# Patient Record
Sex: FEMALE | Race: WHITE | NOT HISPANIC OR LATINO | Employment: FULL TIME | URBAN - METROPOLITAN AREA
[De-identification: names, ages, dates, MRNs, and addresses within clinical notes are randomized per-mention and may not be internally consistent; named-entity substitution may affect disease eponyms.]

---

## 2022-07-08 ENCOUNTER — LAB VISIT (OUTPATIENT)
Dept: LAB | Facility: HOSPITAL | Age: 37
End: 2022-07-08
Attending: STUDENT IN AN ORGANIZED HEALTH CARE EDUCATION/TRAINING PROGRAM
Payer: COMMERCIAL

## 2022-07-08 ENCOUNTER — OFFICE VISIT (OUTPATIENT)
Dept: FAMILY MEDICINE | Facility: CLINIC | Age: 37
End: 2022-07-08
Payer: COMMERCIAL

## 2022-07-08 VITALS
HEIGHT: 66 IN | HEART RATE: 61 BPM | DIASTOLIC BLOOD PRESSURE: 75 MMHG | WEIGHT: 142.38 LBS | RESPIRATION RATE: 18 BRPM | BODY MASS INDEX: 22.88 KG/M2 | SYSTOLIC BLOOD PRESSURE: 111 MMHG | OXYGEN SATURATION: 99 % | TEMPERATURE: 98 F

## 2022-07-08 DIAGNOSIS — R51.9 CHRONIC NONINTRACTABLE HEADACHE, UNSPECIFIED HEADACHE TYPE: ICD-10-CM

## 2022-07-08 DIAGNOSIS — S03.00XS DISLOCATION OF TEMPOROMANDIBULAR JOINT, SEQUELA: ICD-10-CM

## 2022-07-08 DIAGNOSIS — F41.9 ANXIETY: ICD-10-CM

## 2022-07-08 DIAGNOSIS — G89.29 CHRONIC NONINTRACTABLE HEADACHE, UNSPECIFIED HEADACHE TYPE: ICD-10-CM

## 2022-07-08 DIAGNOSIS — M40.202 KYPHOSIS OF CERVICAL REGION, UNSPECIFIED KYPHOSIS TYPE: ICD-10-CM

## 2022-07-08 DIAGNOSIS — Z00.00 ANNUAL PHYSICAL EXAM: ICD-10-CM

## 2022-07-08 DIAGNOSIS — Z00.00 ANNUAL PHYSICAL EXAM: Primary | ICD-10-CM

## 2022-07-08 DIAGNOSIS — Z23 NEED FOR TDAP VACCINATION: ICD-10-CM

## 2022-07-08 PROBLEM — S03.00XA TMJ (DISLOCATION OF TEMPOROMANDIBULAR JOINT): Status: ACTIVE | Noted: 2022-07-08

## 2022-07-08 LAB
ALBUMIN SERPL-MCNC: 4.4 GM/DL (ref 3.5–5)
ALBUMIN/GLOB SERPL: 1.3 RATIO (ref 1.1–2)
ALP SERPL-CCNC: 40 UNIT/L (ref 40–150)
ALT SERPL-CCNC: 13 UNIT/L (ref 0–55)
AST SERPL-CCNC: 20 UNIT/L (ref 5–34)
BILIRUBIN DIRECT+TOT PNL SERPL-MCNC: 0.6 MG/DL
BUN SERPL-MCNC: 11.6 MG/DL (ref 7–18.7)
CALCIUM SERPL-MCNC: 10 MG/DL (ref 8.4–10.2)
CHLORIDE SERPL-SCNC: 103 MMOL/L (ref 98–107)
CHOLEST SERPL-MCNC: 206 MG/DL
CHOLEST/HDLC SERPL: 3 {RATIO} (ref 0–5)
CO2 SERPL-SCNC: 28 MMOL/L (ref 22–29)
CREAT SERPL-MCNC: 0.71 MG/DL (ref 0.55–1.02)
GLOBULIN SER-MCNC: 3.4 GM/DL (ref 2.4–3.5)
GLUCOSE SERPL-MCNC: 86 MG/DL (ref 74–100)
HDLC SERPL-MCNC: 79 MG/DL (ref 35–60)
LDLC SERPL CALC-MCNC: 114 MG/DL (ref 50–140)
POTASSIUM SERPL-SCNC: 4.1 MMOL/L (ref 3.5–5.1)
PROT SERPL-MCNC: 7.8 GM/DL (ref 6.4–8.3)
SODIUM SERPL-SCNC: 140 MMOL/L (ref 136–145)
TRIGL SERPL-MCNC: 64 MG/DL (ref 37–140)
VLDLC SERPL CALC-MCNC: 13 MG/DL

## 2022-07-08 PROCEDURE — 3008F BODY MASS INDEX DOCD: CPT | Mod: CPTII,,, | Performed by: STUDENT IN AN ORGANIZED HEALTH CARE EDUCATION/TRAINING PROGRAM

## 2022-07-08 PROCEDURE — 90471 PR IMMUNIZ ADMIN,1 SINGLE/COMB VAC/TOXOID: ICD-10-PCS | Mod: ,,, | Performed by: STUDENT IN AN ORGANIZED HEALTH CARE EDUCATION/TRAINING PROGRAM

## 2022-07-08 PROCEDURE — 3078F PR MOST RECENT DIASTOLIC BLOOD PRESSURE < 80 MM HG: ICD-10-PCS | Mod: CPTII,,, | Performed by: STUDENT IN AN ORGANIZED HEALTH CARE EDUCATION/TRAINING PROGRAM

## 2022-07-08 PROCEDURE — 3078F DIAST BP <80 MM HG: CPT | Mod: CPTII,,, | Performed by: STUDENT IN AN ORGANIZED HEALTH CARE EDUCATION/TRAINING PROGRAM

## 2022-07-08 PROCEDURE — 90715 TDAP VACCINE 7 YRS/> IM: CPT | Mod: ,,, | Performed by: STUDENT IN AN ORGANIZED HEALTH CARE EDUCATION/TRAINING PROGRAM

## 2022-07-08 PROCEDURE — 90471 IMMUNIZATION ADMIN: CPT | Mod: ,,, | Performed by: STUDENT IN AN ORGANIZED HEALTH CARE EDUCATION/TRAINING PROGRAM

## 2022-07-08 PROCEDURE — 1159F PR MEDICATION LIST DOCUMENTED IN MEDICAL RECORD: ICD-10-PCS | Mod: CPTII,,, | Performed by: STUDENT IN AN ORGANIZED HEALTH CARE EDUCATION/TRAINING PROGRAM

## 2022-07-08 PROCEDURE — 99385 PREV VISIT NEW AGE 18-39: CPT | Mod: 25,,, | Performed by: STUDENT IN AN ORGANIZED HEALTH CARE EDUCATION/TRAINING PROGRAM

## 2022-07-08 PROCEDURE — 80061 LIPID PANEL: CPT

## 2022-07-08 PROCEDURE — 3074F PR MOST RECENT SYSTOLIC BLOOD PRESSURE < 130 MM HG: ICD-10-PCS | Mod: CPTII,,, | Performed by: STUDENT IN AN ORGANIZED HEALTH CARE EDUCATION/TRAINING PROGRAM

## 2022-07-08 PROCEDURE — 1159F MED LIST DOCD IN RCRD: CPT | Mod: CPTII,,, | Performed by: STUDENT IN AN ORGANIZED HEALTH CARE EDUCATION/TRAINING PROGRAM

## 2022-07-08 PROCEDURE — 90715 PR TDAP VACCINE >7 YO, IM: ICD-10-PCS | Mod: ,,, | Performed by: STUDENT IN AN ORGANIZED HEALTH CARE EDUCATION/TRAINING PROGRAM

## 2022-07-08 PROCEDURE — 80053 COMPREHEN METABOLIC PANEL: CPT

## 2022-07-08 PROCEDURE — 3074F SYST BP LT 130 MM HG: CPT | Mod: CPTII,,, | Performed by: STUDENT IN AN ORGANIZED HEALTH CARE EDUCATION/TRAINING PROGRAM

## 2022-07-08 PROCEDURE — 3008F PR BODY MASS INDEX (BMI) DOCUMENTED: ICD-10-PCS | Mod: CPTII,,, | Performed by: STUDENT IN AN ORGANIZED HEALTH CARE EDUCATION/TRAINING PROGRAM

## 2022-07-08 PROCEDURE — 99385 PR PREVENTIVE VISIT,NEW,18-39: ICD-10-PCS | Mod: 25,,, | Performed by: STUDENT IN AN ORGANIZED HEALTH CARE EDUCATION/TRAINING PROGRAM

## 2022-07-08 PROCEDURE — 36415 COLL VENOUS BLD VENIPUNCTURE: CPT

## 2022-07-08 RX ORDER — SUMATRIPTAN 50 MG/1
TABLET, FILM COATED ORAL
COMMUNITY
Start: 2022-06-13 | End: 2022-07-08 | Stop reason: SDUPTHER

## 2022-07-08 RX ORDER — BUSPIRONE HYDROCHLORIDE 10 MG/1
10 TABLET ORAL 2 TIMES DAILY
COMMUNITY
Start: 2022-06-07 | End: 2023-07-23

## 2022-07-08 RX ORDER — SUMATRIPTAN 50 MG/1
50 TABLET, FILM COATED ORAL ONCE
Qty: 10 TABLET | Refills: 3 | Status: SHIPPED | OUTPATIENT
Start: 2022-07-08 | End: 2023-07-23

## 2022-07-08 NOTE — PROGRESS NOTES
"Subjective:      Patient ID: Kerri Ramsey is a 37 y.o.  female.    Chief Complaint: Establish Care/Wellness    Preventative Health: Patient amenable to Tdap vaccine. Patient following with Dr. Richard Serrato with OB-GYN for her well-woman exam. She used to live in [place], [place] and states she had a Pap smear there that was good for 5 years.    Family Hx: Father has bladder CA.    Headaches/TMJ/Kyphosis of Neck: Patient taking Imitrex PRN. Sometimes she takes it 1-2x weekly or none at all. Patient following with a chiropractor as well.    Hepatitis C: Patient following with Dr. Benji Isaac with GI. Patient has been treated.    Anxiety: Patient following with Psychiatry. She is taking Buspar 10mg BID.    Review of Systems   Constitutional: Negative for activity change, fatigue and fever.   Eyes: Negative for visual disturbance.   Respiratory: Negative for apnea, cough and shortness of breath.    Cardiovascular: Negative for chest pain and leg swelling.   Gastrointestinal: Negative for abdominal pain, diarrhea and nausea.   Genitourinary: Negative for dysuria and hematuria.   Musculoskeletal: Positive for arthralgias (+TMJ) and neck pain. Negative for back pain and myalgias.   Skin: Negative for rash and wound.   Neurological: Positive for headaches. Negative for dizziness, weakness and numbness.   Psychiatric/Behavioral: Negative for behavioral problems, confusion, hallucinations and suicidal ideas. The patient is nervous/anxious.      Objective:   /75 (BP Location: Right arm)   Pulse 61   Temp 98.3 °F (36.8 °C)   Resp 18   Ht 5' 5.5" (1.664 m)   Wt 64.6 kg (142 lb 6.4 oz)   LMP 06/28/2022 (Exact Date)   SpO2 99%   BMI 23.34 kg/m²     Physical Exam  Vitals and nursing note reviewed.   Constitutional:       General: She is not in acute distress.     Appearance: Normal appearance. She is not ill-appearing or toxic-appearing.   HENT:      Head: Normocephalic and atraumatic.      " Mouth/Throat:      Mouth: Mucous membranes are moist.      Pharynx: Oropharynx is clear.   Cardiovascular:      Rate and Rhythm: Normal rate and regular rhythm.   Pulmonary:      Effort: Pulmonary effort is normal. No respiratory distress.      Breath sounds: Normal breath sounds.   Abdominal:      General: There is no distension.      Palpations: Abdomen is soft.      Tenderness: There is no abdominal tenderness.   Musculoskeletal:         General: Normal range of motion.      Cervical back: Neck supple. No tenderness.      Right lower leg: No edema.      Left lower leg: No edema.   Lymphadenopathy:      Cervical: No cervical adenopathy.   Skin:     General: Skin is warm and dry.      Findings: No lesion or rash.   Neurological:      General: No focal deficit present.      Mental Status: She is alert.   Psychiatric:         Mood and Affect: Mood normal.         Behavior: Behavior normal.         Thought Content: Thought content normal.         Judgment: Judgment normal.       Assessment:     1. Annual physical exam    2. Need for Tdap vaccination    3. Chronic nonintractable headache, unspecified headache type    4. Anxiety    5. Kyphosis of cervical region, unspecified kyphosis type    6. Dislocation of temporomandibular joint, sequela      Plan:     Problem List Items Addressed This Visit        Neuro    Chronic nonintractable headache     - Continue Imitrex 50mg PRN.           Relevant Medications    sumatriptan (IMITREX) 50 MG tablet       Psychiatric    Anxiety     - Continue Buspar 10mg BID.              ENT    TMJ (dislocation of temporomandibular joint)       Orthopedic    Kyphosis of cervical region     - Patient following with a chiropractor.             Other Visit Diagnoses     Annual physical exam    -  Primary    Relevant Orders    Comprehensive Metabolic Panel    Lipid Panel    Need for Tdap vaccination        Relevant Medications    Tdap (BOOSTRIX) vaccine injection 0.5 mL (Completed)

## 2022-09-15 ENCOUNTER — LAB VISIT (OUTPATIENT)
Dept: LAB | Facility: HOSPITAL | Age: 37
End: 2022-09-15
Attending: INTERNAL MEDICINE
Payer: COMMERCIAL

## 2022-09-15 DIAGNOSIS — K73.9 CHRONIC HEPATITIS: Primary | ICD-10-CM

## 2022-09-15 PROCEDURE — 87522 HEPATITIS C REVRS TRNSCRPJ: CPT

## 2022-09-15 PROCEDURE — 36415 COLL VENOUS BLD VENIPUNCTURE: CPT

## 2022-09-16 LAB — HCV RNA SERPL NAA+PROBE-ACNC: NORMAL IU/ML

## 2022-09-27 ENCOUNTER — LAB VISIT (OUTPATIENT)
Dept: LAB | Facility: HOSPITAL | Age: 37
End: 2022-09-27
Attending: OBSTETRICS & GYNECOLOGY
Payer: COMMERCIAL

## 2022-09-27 DIAGNOSIS — O20.0 THREATENED ABORTION, ANTEPARTUM: Primary | ICD-10-CM

## 2022-09-27 LAB
B-HCG FREE SERPL-ACNC: 252.39 MIU/ML
PROGEST SERPL-MCNC: 10.3 NG/ML

## 2022-09-27 PROCEDURE — 84144 ASSAY OF PROGESTERONE: CPT

## 2022-09-27 PROCEDURE — 84702 CHORIONIC GONADOTROPIN TEST: CPT

## 2022-09-27 PROCEDURE — 36415 COLL VENOUS BLD VENIPUNCTURE: CPT

## 2022-10-04 ENCOUNTER — LAB VISIT (OUTPATIENT)
Dept: LAB | Facility: HOSPITAL | Age: 37
End: 2022-10-04
Attending: OBSTETRICS & GYNECOLOGY
Payer: COMMERCIAL

## 2022-10-04 DIAGNOSIS — O03.9 THREATENED ABORTION, DELIVERED: Primary | ICD-10-CM

## 2022-10-04 LAB
B-HCG FREE SERPL-ACNC: 104.9 MIU/ML
GROUP & RH: NORMAL
INDIRECT COOMBS GEL: NORMAL

## 2022-10-04 PROCEDURE — 86850 RBC ANTIBODY SCREEN: CPT | Performed by: OBSTETRICS & GYNECOLOGY

## 2022-10-04 PROCEDURE — 84702 CHORIONIC GONADOTROPIN TEST: CPT

## 2022-10-04 PROCEDURE — 36415 COLL VENOUS BLD VENIPUNCTURE: CPT

## 2022-10-12 ENCOUNTER — LAB VISIT (OUTPATIENT)
Dept: LAB | Facility: HOSPITAL | Age: 37
End: 2022-10-12
Attending: OBSTETRICS & GYNECOLOGY
Payer: COMMERCIAL

## 2022-10-12 DIAGNOSIS — O03.9 THREATENED ABORTION, DELIVERED: Primary | ICD-10-CM

## 2022-10-12 LAB — B-HCG FREE SERPL-ACNC: 312.62 MIU/ML

## 2022-10-12 PROCEDURE — 36415 COLL VENOUS BLD VENIPUNCTURE: CPT

## 2022-10-12 PROCEDURE — 84702 CHORIONIC GONADOTROPIN TEST: CPT

## 2022-10-17 ENCOUNTER — LAB VISIT (OUTPATIENT)
Dept: LAB | Facility: HOSPITAL | Age: 37
End: 2022-10-17
Attending: OBSTETRICS & GYNECOLOGY
Payer: COMMERCIAL

## 2022-10-17 DIAGNOSIS — O20.0 THREATENED ABORTION, ANTEPARTUM: Primary | ICD-10-CM

## 2022-10-17 LAB — B-HCG FREE SERPL-ACNC: 213.05 MIU/ML

## 2022-10-17 PROCEDURE — 36415 COLL VENOUS BLD VENIPUNCTURE: CPT

## 2022-10-17 PROCEDURE — 84702 CHORIONIC GONADOTROPIN TEST: CPT

## 2022-10-24 ENCOUNTER — LAB VISIT (OUTPATIENT)
Dept: LAB | Facility: HOSPITAL | Age: 37
End: 2022-10-24
Attending: OBSTETRICS & GYNECOLOGY
Payer: COMMERCIAL

## 2022-10-24 DIAGNOSIS — O20.0 THREATENED ABORTION, ANTEPARTUM: Primary | ICD-10-CM

## 2022-10-24 LAB — B-HCG FREE SERPL-ACNC: 11.72 MIU/ML

## 2022-10-24 PROCEDURE — 84702 CHORIONIC GONADOTROPIN TEST: CPT

## 2022-10-24 PROCEDURE — 36415 COLL VENOUS BLD VENIPUNCTURE: CPT

## 2022-11-14 ENCOUNTER — LAB VISIT (OUTPATIENT)
Dept: LAB | Facility: HOSPITAL | Age: 37
End: 2022-11-14
Attending: OBSTETRICS & GYNECOLOGY
Payer: COMMERCIAL

## 2022-11-14 DIAGNOSIS — N91.2 ABSENCE OF MENSTRUATION: Primary | ICD-10-CM

## 2022-11-14 LAB — B-HCG FREE SERPL-ACNC: 278.44 MIU/ML

## 2022-11-14 PROCEDURE — 84702 CHORIONIC GONADOTROPIN TEST: CPT

## 2022-11-14 PROCEDURE — 36415 COLL VENOUS BLD VENIPUNCTURE: CPT

## 2022-11-16 ENCOUNTER — LAB VISIT (OUTPATIENT)
Dept: LAB | Facility: HOSPITAL | Age: 37
End: 2022-11-16
Attending: OBSTETRICS & GYNECOLOGY
Payer: COMMERCIAL

## 2022-11-16 DIAGNOSIS — N91.2 ABSENCE OF MENSTRUATION: Primary | ICD-10-CM

## 2022-11-16 LAB
B-HCG FREE SERPL-ACNC: 726.99 MIU/ML
PROGEST SERPL-MCNC: 28.4 NG/ML

## 2022-11-16 PROCEDURE — 84144 ASSAY OF PROGESTERONE: CPT

## 2022-11-16 PROCEDURE — 36415 COLL VENOUS BLD VENIPUNCTURE: CPT

## 2022-11-16 PROCEDURE — 84702 CHORIONIC GONADOTROPIN TEST: CPT

## 2022-11-23 ENCOUNTER — LAB VISIT (OUTPATIENT)
Dept: LAB | Facility: HOSPITAL | Age: 37
End: 2022-11-23
Attending: OBSTETRICS & GYNECOLOGY
Payer: COMMERCIAL

## 2022-11-23 DIAGNOSIS — N91.2 ABSENCE OF MENSTRUATION: Primary | ICD-10-CM

## 2022-11-23 LAB — B-HCG FREE SERPL-ACNC: ABNORMAL MIU/ML

## 2022-11-23 PROCEDURE — 36415 COLL VENOUS BLD VENIPUNCTURE: CPT

## 2022-11-23 PROCEDURE — 84702 CHORIONIC GONADOTROPIN TEST: CPT

## 2023-05-16 ENCOUNTER — LAB VISIT (OUTPATIENT)
Dept: LAB | Facility: HOSPITAL | Age: 38
End: 2023-05-16
Attending: OBSTETRICS & GYNECOLOGY
Payer: COMMERCIAL

## 2023-05-16 DIAGNOSIS — Z34.92 SECOND TRIMESTER PREGNANCY: Primary | ICD-10-CM

## 2023-05-16 LAB
ERYTHROCYTE [DISTWIDTH] IN BLOOD BY AUTOMATED COUNT: 12.6 % (ref 11.5–17)
GLUCOSE 1H P 100 G GLC PO SERPL-MCNC: 117 MG/DL (ref 74–100)
HCT VFR BLD AUTO: 34.7 % (ref 37–47)
HGB BLD-MCNC: 11.5 G/DL (ref 12–16)
MCH RBC QN AUTO: 32.2 PG (ref 27–31)
MCHC RBC AUTO-ENTMCNC: 33.1 G/DL (ref 33–36)
MCV RBC AUTO: 97.2 FL (ref 80–94)
NRBC BLD AUTO-RTO: 0 %
PLATELET # BLD AUTO: 314 X10(3)/MCL (ref 130–400)
PMV BLD AUTO: 8.9 FL (ref 7.4–10.4)
RBC # BLD AUTO: 3.57 X10(6)/MCL (ref 4.2–5.4)
WBC # SPEC AUTO: 8.91 X10(3)/MCL (ref 4.5–11.5)

## 2023-05-16 PROCEDURE — 85027 COMPLETE CBC AUTOMATED: CPT

## 2023-05-16 PROCEDURE — 36415 COLL VENOUS BLD VENIPUNCTURE: CPT

## 2023-05-16 PROCEDURE — 82950 GLUCOSE TEST: CPT

## 2023-06-20 ENCOUNTER — LAB VISIT (OUTPATIENT)
Dept: LAB | Facility: HOSPITAL | Age: 38
End: 2023-06-20
Attending: INTERNAL MEDICINE
Payer: COMMERCIAL

## 2023-06-20 DIAGNOSIS — B18.2 CHRONIC HEPATITIS C: Primary | ICD-10-CM

## 2023-06-20 PROCEDURE — 36415 COLL VENOUS BLD VENIPUNCTURE: CPT

## 2023-06-21 LAB — HCV RNA SERPL NAA+PROBE-ACNC: NORMAL IU/ML

## 2023-07-23 ENCOUNTER — ANESTHESIA (OUTPATIENT)
Dept: OBSTETRICS AND GYNECOLOGY | Facility: HOSPITAL | Age: 38
End: 2023-07-23
Payer: COMMERCIAL

## 2023-07-23 ENCOUNTER — HOSPITAL ENCOUNTER (INPATIENT)
Facility: HOSPITAL | Age: 38
LOS: 3 days | Discharge: HOME | End: 2023-07-26
Attending: SPECIALIST | Admitting: SPECIALIST
Payer: COMMERCIAL

## 2023-07-23 ENCOUNTER — ANESTHESIA EVENT (OUTPATIENT)
Dept: OBSTETRICS AND GYNECOLOGY | Facility: HOSPITAL | Age: 38
End: 2023-07-23
Payer: COMMERCIAL

## 2023-07-23 DIAGNOSIS — Z78.9 ADMITTED TO LABOR AND DELIVERY: ICD-10-CM

## 2023-07-23 DIAGNOSIS — Z37.9 NORMAL LABOR: Primary | ICD-10-CM

## 2023-07-23 PROBLEM — Z3A.39 39 WEEKS GESTATION OF PREGNANCY: Status: ACTIVE | Noted: 2023-07-23

## 2023-07-23 PROBLEM — O47.1 FALSE LABOR AFTER 37 COMPLETED WEEKS OF GESTATION: Status: ACTIVE | Noted: 2023-07-23

## 2023-07-23 LAB
BASOPHILS # BLD AUTO: 0.03 X10(3)/MCL
BASOPHILS NFR BLD AUTO: 0.3 %
EOSINOPHIL # BLD AUTO: 0.06 X10(3)/MCL (ref 0–0.9)
EOSINOPHIL NFR BLD AUTO: 0.6 %
ERYTHROCYTE [DISTWIDTH] IN BLOOD BY AUTOMATED COUNT: 12.8 % (ref 11.5–17)
GROUP & RH: NORMAL
HCT VFR BLD AUTO: 35.6 % (ref 37–47)
HGB BLD-MCNC: 12.3 G/DL (ref 12–16)
IMM GRANULOCYTES # BLD AUTO: 0.15 X10(3)/MCL (ref 0–0.04)
IMM GRANULOCYTES NFR BLD AUTO: 1.5 %
INDIRECT COOMBS GEL: NORMAL
LYMPHOCYTES # BLD AUTO: 2.4 X10(3)/MCL (ref 0.6–4.6)
LYMPHOCYTES NFR BLD AUTO: 23.3 %
MCH RBC QN AUTO: 32 PG (ref 27–31)
MCHC RBC AUTO-ENTMCNC: 34.6 G/DL (ref 33–36)
MCV RBC AUTO: 92.7 FL (ref 80–94)
MONOCYTES # BLD AUTO: 1.06 X10(3)/MCL (ref 0.1–1.3)
MONOCYTES NFR BLD AUTO: 10.3 %
NEUTROPHILS # BLD AUTO: 6.62 X10(3)/MCL (ref 2.1–9.2)
NEUTROPHILS NFR BLD AUTO: 64 %
NRBC BLD AUTO-RTO: 0 %
PLATELET # BLD AUTO: 324 X10(3)/MCL (ref 130–400)
PMV BLD AUTO: 9.6 FL (ref 7.4–10.4)
RBC # BLD AUTO: 3.84 X10(6)/MCL (ref 4.2–5.4)
SPECIMEN OUTDATE: NORMAL
WBC # SPEC AUTO: 10.32 X10(3)/MCL (ref 4.5–11.5)

## 2023-07-23 PROCEDURE — 62326 NJX INTERLAMINAR LMBR/SAC: CPT | Performed by: NURSE ANESTHETIST, CERTIFIED REGISTERED

## 2023-07-23 PROCEDURE — 96360 HYDRATION IV INFUSION INIT: CPT

## 2023-07-23 PROCEDURE — 59409 PRA ETRICAL CARE,VAG DELIV ONLY: ICD-10-PCS | Mod: QZ,,, | Performed by: NURSE ANESTHETIST, CERTIFIED REGISTERED

## 2023-07-23 PROCEDURE — 59409 OBSTETRICAL CARE: CPT | Mod: QZ,,, | Performed by: NURSE ANESTHETIST, CERTIFIED REGISTERED

## 2023-07-23 PROCEDURE — 86900 BLOOD TYPING SEROLOGIC ABO: CPT | Mod: 91 | Performed by: OBSTETRICS & GYNECOLOGY

## 2023-07-23 PROCEDURE — 25000003 PHARM REV CODE 250: Performed by: NURSE ANESTHETIST, CERTIFIED REGISTERED

## 2023-07-23 PROCEDURE — 85025 COMPLETE CBC W/AUTO DIFF WBC: CPT | Performed by: OBSTETRICS & GYNECOLOGY

## 2023-07-23 PROCEDURE — 96361 HYDRATE IV INFUSION ADD-ON: CPT

## 2023-07-23 PROCEDURE — 63600175 PHARM REV CODE 636 W HCPCS: Performed by: NURSE ANESTHETIST, CERTIFIED REGISTERED

## 2023-07-23 PROCEDURE — 11000001 HC ACUTE MED/SURG PRIVATE ROOM

## 2023-07-23 PROCEDURE — 86780 TREPONEMA PALLIDUM: CPT | Performed by: OBSTETRICS & GYNECOLOGY

## 2023-07-23 PROCEDURE — 99285 EMERGENCY DEPT VISIT HI MDM: CPT

## 2023-07-23 PROCEDURE — 63600175 PHARM REV CODE 636 W HCPCS: Performed by: OBSTETRICS & GYNECOLOGY

## 2023-07-23 RX ORDER — ONDANSETRON 2 MG/ML
4 INJECTION INTRAMUSCULAR; INTRAVENOUS EVERY 6 HOURS PRN
Status: DISCONTINUED | OUTPATIENT
Start: 2023-07-23 | End: 2023-07-26 | Stop reason: HOSPADM

## 2023-07-23 RX ORDER — OXYTOCIN/RINGER'S LACTATE 30/500 ML
95 PLASTIC BAG, INJECTION (ML) INTRAVENOUS ONCE
Status: DISCONTINUED | OUTPATIENT
Start: 2023-07-23 | End: 2023-07-26 | Stop reason: HOSPADM

## 2023-07-23 RX ORDER — MUPIROCIN 20 MG/G
OINTMENT TOPICAL
Status: DISCONTINUED | OUTPATIENT
Start: 2023-07-23 | End: 2023-07-26 | Stop reason: HOSPADM

## 2023-07-23 RX ORDER — LIDOCAINE HYDROCHLORIDE 10 MG/ML
10 INJECTION INFILTRATION; PERINEURAL ONCE AS NEEDED
Status: DISCONTINUED | OUTPATIENT
Start: 2023-07-23 | End: 2023-07-26 | Stop reason: HOSPADM

## 2023-07-23 RX ORDER — CALCIUM CARBONATE 200(500)MG
500 TABLET,CHEWABLE ORAL 3 TIMES DAILY PRN
Status: DISCONTINUED | OUTPATIENT
Start: 2023-07-23 | End: 2023-07-26 | Stop reason: HOSPADM

## 2023-07-23 RX ORDER — OXYTOCIN 10 [USP'U]/ML
10 INJECTION, SOLUTION INTRAMUSCULAR; INTRAVENOUS
Status: DISCONTINUED | OUTPATIENT
Start: 2023-07-23 | End: 2023-07-26 | Stop reason: HOSPADM

## 2023-07-23 RX ORDER — MISOPROSTOL 100 UG/1
800 TABLET ORAL
Status: DISCONTINUED | OUTPATIENT
Start: 2023-07-23 | End: 2023-07-26 | Stop reason: HOSPADM

## 2023-07-23 RX ORDER — CARBOPROST TROMETHAMINE 250 UG/ML
250 INJECTION, SOLUTION INTRAMUSCULAR
Status: DISCONTINUED | OUTPATIENT
Start: 2023-07-23 | End: 2023-07-26 | Stop reason: HOSPADM

## 2023-07-23 RX ORDER — SODIUM CHLORIDE, SODIUM LACTATE, POTASSIUM CHLORIDE, CALCIUM CHLORIDE 600; 310; 30; 20 MG/100ML; MG/100ML; MG/100ML; MG/100ML
INJECTION, SOLUTION INTRAVENOUS CONTINUOUS
Status: DISCONTINUED | OUTPATIENT
Start: 2023-07-23 | End: 2023-07-26 | Stop reason: HOSPADM

## 2023-07-23 RX ORDER — FENTANYL/BUPIVACAINE/NS/PF 2-1250MCG
PLASTIC BAG, INJECTION (ML) INJECTION CONTINUOUS
Status: DISCONTINUED | OUTPATIENT
Start: 2023-07-23 | End: 2023-07-24

## 2023-07-23 RX ORDER — FENTANYL/BUPIVACAINE/NS/PF 2-1250MCG
PLASTIC BAG, INJECTION (ML) INJECTION CONTINUOUS PRN
Status: DISCONTINUED | OUTPATIENT
Start: 2023-07-23 | End: 2023-07-24

## 2023-07-23 RX ORDER — PROCHLORPERAZINE EDISYLATE 5 MG/ML
5 INJECTION INTRAMUSCULAR; INTRAVENOUS EVERY 6 HOURS PRN
Status: DISCONTINUED | OUTPATIENT
Start: 2023-07-23 | End: 2023-07-24

## 2023-07-23 RX ORDER — ACETAMINOPHEN 325 MG/1
650 TABLET ORAL EVERY 6 HOURS PRN
Status: DISCONTINUED | OUTPATIENT
Start: 2023-07-23 | End: 2023-07-26 | Stop reason: HOSPADM

## 2023-07-23 RX ORDER — METHYLERGONOVINE MALEATE 0.2 MG/ML
200 INJECTION INTRAVENOUS
Status: DISCONTINUED | OUTPATIENT
Start: 2023-07-23 | End: 2023-07-24

## 2023-07-23 RX ORDER — BUPIVACAINE HYDROCHLORIDE 2.5 MG/ML
INJECTION, SOLUTION EPIDURAL; INFILTRATION; INTRACAUDAL
Status: DISCONTINUED | OUTPATIENT
Start: 2023-07-23 | End: 2023-07-24

## 2023-07-23 RX ORDER — DIPHENOXYLATE HYDROCHLORIDE AND ATROPINE SULFATE 2.5; .025 MG/1; MG/1
1 TABLET ORAL 4 TIMES DAILY PRN
Status: DISCONTINUED | OUTPATIENT
Start: 2023-07-23 | End: 2023-07-24

## 2023-07-23 RX ORDER — SIMETHICONE 80 MG
1 TABLET,CHEWABLE ORAL 4 TIMES DAILY PRN
Status: DISCONTINUED | OUTPATIENT
Start: 2023-07-23 | End: 2023-07-24

## 2023-07-23 RX ORDER — OXYTOCIN/RINGER'S LACTATE 30/500 ML
334 PLASTIC BAG, INJECTION (ML) INTRAVENOUS ONCE
Status: DISCONTINUED | OUTPATIENT
Start: 2023-07-23 | End: 2023-07-26 | Stop reason: HOSPADM

## 2023-07-23 RX ORDER — DEXTROSE, SODIUM CHLORIDE, SODIUM LACTATE, POTASSIUM CHLORIDE, AND CALCIUM CHLORIDE 5; .6; .31; .03; .02 G/100ML; G/100ML; G/100ML; G/100ML; G/100ML
INJECTION, SOLUTION INTRAVENOUS CONTINUOUS
Status: DISCONTINUED | OUTPATIENT
Start: 2023-07-23 | End: 2023-07-24

## 2023-07-23 RX ADMIN — SODIUM CHLORIDE, SODIUM LACTATE, POTASSIUM CHLORIDE, CALCIUM CHLORIDE AND DEXTROSE MONOHYDRATE: 5; 600; 310; 30; 20 INJECTION, SOLUTION INTRAVENOUS at 09:07

## 2023-07-23 RX ADMIN — BUPIVACAINE HYDROCHLORIDE 8 ML: 2.5 INJECTION, SOLUTION EPIDURAL; INFILTRATION; INTRACAUDAL; PERINEURAL at 10:07

## 2023-07-23 RX ADMIN — Medication 12 ML/HR: at 10:07

## 2023-07-24 LAB — T PALLIDUM AB SER QL: NONREACTIVE

## 2023-07-24 PROCEDURE — 63600175 PHARM REV CODE 636 W HCPCS: Performed by: OBSTETRICS & GYNECOLOGY

## 2023-07-24 PROCEDURE — 11000001 HC ACUTE MED/SURG PRIVATE ROOM

## 2023-07-24 PROCEDURE — 51702 INSERT TEMP BLADDER CATH: CPT

## 2023-07-24 PROCEDURE — 72200005 HC VAGINAL DELIVERY LEVEL II

## 2023-07-24 PROCEDURE — 72100003 HC LABOR CARE, EA. ADDL. 8 HRS

## 2023-07-24 PROCEDURE — 25000003 PHARM REV CODE 250: Performed by: OBSTETRICS & GYNECOLOGY

## 2023-07-24 PROCEDURE — 72100002 HC LABOR CARE, 1ST 8 HOURS

## 2023-07-24 RX ORDER — PRENATAL WITH FERROUS FUM AND FOLIC ACID 3080; 920; 120; 400; 22; 1.84; 3; 20; 10; 1; 12; 200; 27; 25; 2 [IU]/1; [IU]/1; MG/1; [IU]/1; MG/1; MG/1; MG/1; MG/1; MG/1; MG/1; UG/1; MG/1; MG/1; MG/1; MG/1
1 TABLET ORAL DAILY
Status: DISCONTINUED | OUTPATIENT
Start: 2023-07-25 | End: 2023-07-26 | Stop reason: HOSPADM

## 2023-07-24 RX ORDER — ACETAMINOPHEN 325 MG/1
650 TABLET ORAL EVERY 6 HOURS PRN
Status: DISCONTINUED | OUTPATIENT
Start: 2023-07-24 | End: 2023-07-26 | Stop reason: HOSPADM

## 2023-07-24 RX ORDER — OXYTOCIN/RINGER'S LACTATE 30/500 ML
95 PLASTIC BAG, INJECTION (ML) INTRAVENOUS ONCE AS NEEDED
Status: COMPLETED | OUTPATIENT
Start: 2023-07-24 | End: 2023-07-24

## 2023-07-24 RX ORDER — DIPHENHYDRAMINE HCL 25 MG
25 CAPSULE ORAL EVERY 4 HOURS PRN
Status: DISCONTINUED | OUTPATIENT
Start: 2023-07-24 | End: 2023-07-26 | Stop reason: HOSPADM

## 2023-07-24 RX ORDER — DOCUSATE SODIUM 100 MG/1
200 CAPSULE, LIQUID FILLED ORAL 2 TIMES DAILY PRN
Status: DISCONTINUED | OUTPATIENT
Start: 2023-07-24 | End: 2023-07-26 | Stop reason: HOSPADM

## 2023-07-24 RX ORDER — ONDANSETRON 4 MG/1
8 TABLET, ORALLY DISINTEGRATING ORAL EVERY 8 HOURS PRN
Status: DISCONTINUED | OUTPATIENT
Start: 2023-07-24 | End: 2023-07-26 | Stop reason: HOSPADM

## 2023-07-24 RX ORDER — DIPHENOXYLATE HYDROCHLORIDE AND ATROPINE SULFATE 2.5; .025 MG/1; MG/1
1 TABLET ORAL 4 TIMES DAILY PRN
Status: DISCONTINUED | OUTPATIENT
Start: 2023-07-24 | End: 2023-07-26 | Stop reason: HOSPADM

## 2023-07-24 RX ORDER — MISOPROSTOL 100 UG/1
800 TABLET ORAL ONCE AS NEEDED
Status: DISCONTINUED | OUTPATIENT
Start: 2023-07-24 | End: 2023-07-26 | Stop reason: HOSPADM

## 2023-07-24 RX ORDER — OXYTOCIN/RINGER'S LACTATE 30/500 ML
334 PLASTIC BAG, INJECTION (ML) INTRAVENOUS ONCE AS NEEDED
Status: DISCONTINUED | OUTPATIENT
Start: 2023-07-24 | End: 2023-07-26 | Stop reason: HOSPADM

## 2023-07-24 RX ORDER — OXYTOCIN/RINGER'S LACTATE 30/500 ML
0-30 PLASTIC BAG, INJECTION (ML) INTRAVENOUS CONTINUOUS
Status: DISCONTINUED | OUTPATIENT
Start: 2023-07-24 | End: 2023-07-24

## 2023-07-24 RX ORDER — CARBOPROST TROMETHAMINE 250 UG/ML
250 INJECTION, SOLUTION INTRAMUSCULAR
Status: DISCONTINUED | OUTPATIENT
Start: 2023-07-24 | End: 2023-07-24

## 2023-07-24 RX ORDER — OXYTOCIN 10 [USP'U]/ML
10 INJECTION, SOLUTION INTRAMUSCULAR; INTRAVENOUS ONCE AS NEEDED
Status: DISCONTINUED | OUTPATIENT
Start: 2023-07-24 | End: 2023-07-26 | Stop reason: HOSPADM

## 2023-07-24 RX ORDER — IBUPROFEN 600 MG/1
600 TABLET ORAL EVERY 6 HOURS PRN
Status: DISCONTINUED | OUTPATIENT
Start: 2023-07-24 | End: 2023-07-26 | Stop reason: HOSPADM

## 2023-07-24 RX ORDER — DIPHENHYDRAMINE HYDROCHLORIDE 50 MG/ML
12.5 INJECTION INTRAMUSCULAR; INTRAVENOUS EVERY 4 HOURS PRN
Status: DISCONTINUED | OUTPATIENT
Start: 2023-07-24 | End: 2023-07-26 | Stop reason: HOSPADM

## 2023-07-24 RX ORDER — HYDROCODONE BITARTRATE AND ACETAMINOPHEN 5; 325 MG/1; MG/1
1 TABLET ORAL EVERY 4 HOURS PRN
Status: DISCONTINUED | OUTPATIENT
Start: 2023-07-24 | End: 2023-07-26 | Stop reason: HOSPADM

## 2023-07-24 RX ORDER — METHYLERGONOVINE MALEATE 0.2 MG/ML
200 INJECTION INTRAVENOUS
Status: DISCONTINUED | OUTPATIENT
Start: 2023-07-24 | End: 2023-07-26 | Stop reason: HOSPADM

## 2023-07-24 RX ORDER — DIPHENHYDRAMINE HYDROCHLORIDE 50 MG/ML
25 INJECTION INTRAMUSCULAR; INTRAVENOUS EVERY 4 HOURS PRN
Status: DISCONTINUED | OUTPATIENT
Start: 2023-07-24 | End: 2023-07-26 | Stop reason: HOSPADM

## 2023-07-24 RX ORDER — HYDROCODONE BITARTRATE AND ACETAMINOPHEN 7.5; 325 MG/1; MG/1
1 TABLET ORAL EVERY 4 HOURS PRN
Status: DISCONTINUED | OUTPATIENT
Start: 2023-07-24 | End: 2023-07-26 | Stop reason: HOSPADM

## 2023-07-24 RX ORDER — MISOPROSTOL 100 UG/1
800 TABLET ORAL
Status: DISCONTINUED | OUTPATIENT
Start: 2023-07-24 | End: 2023-07-24

## 2023-07-24 RX ORDER — ONDANSETRON 2 MG/ML
4 INJECTION INTRAMUSCULAR; INTRAVENOUS ONCE
Status: DISCONTINUED | OUTPATIENT
Start: 2023-07-24 | End: 2023-07-26 | Stop reason: HOSPADM

## 2023-07-24 RX ORDER — PROCHLORPERAZINE EDISYLATE 5 MG/ML
5 INJECTION INTRAMUSCULAR; INTRAVENOUS EVERY 6 HOURS PRN
Status: DISCONTINUED | OUTPATIENT
Start: 2023-07-24 | End: 2023-07-26 | Stop reason: HOSPADM

## 2023-07-24 RX ORDER — CARBOPROST TROMETHAMINE 250 UG/ML
250 INJECTION, SOLUTION INTRAMUSCULAR
Status: DISCONTINUED | OUTPATIENT
Start: 2023-07-24 | End: 2023-07-26 | Stop reason: HOSPADM

## 2023-07-24 RX ORDER — OXYCODONE HYDROCHLORIDE 5 MG/1
10 TABLET ORAL EVERY 6 HOURS PRN
Status: DISCONTINUED | OUTPATIENT
Start: 2023-07-24 | End: 2023-07-26 | Stop reason: HOSPADM

## 2023-07-24 RX ORDER — SODIUM CHLORIDE 0.9 % (FLUSH) 0.9 %
10 SYRINGE (ML) INJECTION
Status: DISCONTINUED | OUTPATIENT
Start: 2023-07-24 | End: 2023-07-26 | Stop reason: HOSPADM

## 2023-07-24 RX ORDER — HYDROCORTISONE 25 MG/G
CREAM TOPICAL 3 TIMES DAILY PRN
Status: DISCONTINUED | OUTPATIENT
Start: 2023-07-24 | End: 2023-07-26 | Stop reason: HOSPADM

## 2023-07-24 RX ORDER — SIMETHICONE 80 MG
1 TABLET,CHEWABLE ORAL EVERY 6 HOURS PRN
Status: DISCONTINUED | OUTPATIENT
Start: 2023-07-24 | End: 2023-07-26 | Stop reason: HOSPADM

## 2023-07-24 RX ORDER — EPHEDRINE SULFATE 50 MG/ML
10 INJECTION, SOLUTION INTRAVENOUS ONCE AS NEEDED
Status: DISCONTINUED | OUTPATIENT
Start: 2023-07-24 | End: 2023-07-26 | Stop reason: HOSPADM

## 2023-07-24 RX ORDER — OXYTOCIN/RINGER'S LACTATE 30/500 ML
95 PLASTIC BAG, INJECTION (ML) INTRAVENOUS ONCE
Status: DISCONTINUED | OUTPATIENT
Start: 2023-07-24 | End: 2023-07-26 | Stop reason: HOSPADM

## 2023-07-24 RX ORDER — FAMOTIDINE 10 MG/ML
20 INJECTION INTRAVENOUS ONCE
Status: COMPLETED | OUTPATIENT
Start: 2023-07-24 | End: 2023-07-24

## 2023-07-24 RX ADMIN — ONDANSETRON 4 MG: 2 INJECTION INTRAMUSCULAR; INTRAVENOUS at 05:07

## 2023-07-24 RX ADMIN — Medication 95 MILLI-UNITS/MIN: at 08:07

## 2023-07-24 RX ADMIN — FAMOTIDINE 20 MG: 10 INJECTION, SOLUTION INTRAVENOUS at 09:07

## 2023-07-24 RX ADMIN — ACETAMINOPHEN 325MG 650 MG: 325 TABLET ORAL at 11:07

## 2023-07-24 RX ADMIN — IBUPROFEN 600 MG: 600 TABLET, FILM COATED ORAL at 08:07

## 2023-07-24 RX ADMIN — Medication 2 MILLI-UNITS/MIN: at 10:07

## 2023-07-24 RX ADMIN — SODIUM CHLORIDE, SODIUM LACTATE, POTASSIUM CHLORIDE, CALCIUM CHLORIDE AND DEXTROSE MONOHYDRATE: 5; 600; 310; 30; 20 INJECTION, SOLUTION INTRAVENOUS at 08:07

## 2023-07-24 RX ADMIN — OXYCODONE HYDROCHLORIDE 10 MG: 5 TABLET ORAL at 09:07

## 2023-07-24 RX ADMIN — ACETAMINOPHEN 325MG 650 MG: 325 TABLET ORAL at 06:07

## 2023-07-24 RX ADMIN — CALCIUM CARBONATE (ANTACID) CHEW TAB 500 MG 500 MG: 500 CHEW TAB at 05:07

## 2023-07-24 RX ADMIN — Medication: at 08:07

## 2023-07-24 NOTE — ED PROVIDER NOTES
PAU NOTE     Admit Date: 2023  PAU Physician: Ivonne Moe  Primary OBGYN: Dr. Richard Serrato    Admit Diagnosis/Chief Complaint: Contractions      Chief Complaint   Patient presents with    Abdominal Pain     IUP @ 39.6 weeks c/o ctx all day, worsening in pain and increasing in frequency for the last couple of hours. States now q5-7m apart. Reports pinkish vaginal dischage       Hospital Course:  Kerri Ramsey is a 38 y.o.  at 39w6d presents complaining of contractions  This IUP is complicated by AMA.    Patient denies vaginal bleeding, leakage of fluid, headache, vision changes, RUQ pain, dysuria, fever, and nausea/vomiting.  Fetal Movement: normal.    /72   Pulse 97   Temp 98.2 °F (36.8 °C)   Resp 20   Temp:  [98.1 °F (36.7 °C)-98.2 °F (36.8 °C)] 98.2 °F (36.8 °C)  Pulse:  [74-97] 97  Resp:  [16-20] 20  SpO2:  [95 %-97 %] 97 %  BP: (117-124)/(72-74) 117/72    General: in no apparent distress well developed and well nourished non-toxic in no respiratory distress and acyanotic alert oriented times 3 afebrile normal vitals cooperative  Abdominal: FHT present  Back: lumbar tenderness absent   CVA tenderness none  Extremeties no redness or tenderness in the calves or thighs no edema    SSE:   SVE:SVE (PeriWATCH)  Dilation (cm): 3  Effacement (%): 80  Station: -2  Cervical Position: Anterior  Cervical Consistency: Soft  Examined by:: GIL Hodge RN  Cardona Score: 10  Simplified Cardona Score: 6     FHT:  Reactive  TOCO: Contractions every 5-8 min      LABS:     Recent Results (from the past 24 hour(s))   Type & Screen    Collection Time: 23 11:06 AM   Result Value Ref Range    Group & Rh A POS     Indirect Kaylie GEL NEG     Specimen Outdate 2023 23:59    SYPHILIS ANTIBODY (WITH REFLEX RPR)    Collection Time: 23 11:06 AM   Result Value Ref Range    Syphilis Antibody Nonreactive Nonreactive, Equivocal   Hepatitis B surface antigen    Collection Time: 23  11:06 AM   Result Value Ref Range    Hepatitis B Surface Antigen Nonreactive Nonreactive   CBC with Differential    Collection Time: 23 11:06 AM   Result Value Ref Range    WBC 8.73 4.50 - 11.50 x10(3)/mcL    RBC 3.75 (L) 4.20 - 5.40 x10(6)/mcL    Hgb 12.0 12.0 - 16.0 g/dL    Hct 34.3 (L) 37.0 - 47.0 %    MCV 91.5 80.0 - 94.0 fL    MCH 32.0 (H) 27.0 - 31.0 pg    MCHC 35.0 33.0 - 36.0 g/dL    RDW 12.7 11.5 - 17.0 %    Platelet 303 130 - 400 x10(3)/mcL    MPV 9.3 7.4 - 10.4 fL    Neut % 67.1 %    Lymph % 20.4 %    Mono % 10.1 %    Eos % 0.9 %    Basophil % 0.2 %    Lymph # 1.78 0.6 - 4.6 x10(3)/mcL    Neut # 5.86 2.1 - 9.2 x10(3)/mcL    Mono # 0.88 0.1 - 1.3 x10(3)/mcL    Eos # 0.08 0 - 0.9 x10(3)/mcL    Baso # 0.02 <=0.2 x10(3)/mcL    IG# 0.11 (H) 0 - 0.04 x10(3)/mcL    IG% 1.3 %    NRBC% 0.0 %   HIV 1/2 Ag/Ab (4th Gen)    Collection Time: 23 11:06 AM   Result Value Ref Range    HIV Nonreactive Nonreactive     [unfilled]     Imaging Results    None          ASSESMENT: Kerri Ramsey is a 38 y.o.   at 39w6d with Contractions  Observation in PAU  Rule out labor  We will reevaluate cervix if belle  If no contractions or cervical change, will discharge from hospital  Labor precautions reviewed with patient  ER precautions reviewed with patient  Patient was given an opportunity to ask questions  She is to follow-up with her primary care physician        Discharge Diagnosis/Clinical Impression**:39 w normal labor  Status:Stable    Patient Instructions: admit, expectant managment      - This note was created with the assistance of [employer] voice recognition software. There may be transcription errors as a result of using this technology however minimal. Effort has been made to assure accuracy of transcription but any obvious errors or omissions should be clarified with the author of the document.

## 2023-07-24 NOTE — ANESTHESIA PREPROCEDURE EVALUATION
07/23/2023  Kerri Ramsey is a 38 y.o., female.      Pre-op Assessment    I have reviewed the Patient Summary Reports.     I have reviewed the Nursing Notes. I have reviewed the NPO Status.   I have reviewed the Medications.     Review of Systems      Physical Exam  General: Well nourished, Cooperative, Alert and Oriented    Airway:  Mallampati: II   Mouth Opening: Normal  TM Distance: Normal  Tongue: Normal  Neck ROM: Normal ROM    Dental:  Intact        Anesthesia Plan  Type of Anesthesia, risks & benefits discussed:    Anesthesia Type: Epidural  Intra-op Monitoring Plan: Standard ASA Monitors  Post Op Pain Control Plan: multimodal analgesia, PCA and epidural analgesia  Informed Consent: Informed consent signed with the Patient and all parties understand the risks and agree with anesthesia plan.  All questions answered.   ASA Score: 2    Ready For Surgery From Anesthesia Perspective.     .

## 2023-07-24 NOTE — ANESTHESIA PROCEDURE NOTES
Epidural    Patient location during procedure: OB   Reason for block: primary anesthetic   Reason for block: labor analgesia requested by patient and obstetrician  Diagnosis: Labor Pain   Start time: 7/23/2023 10:07 PM  Timeout: 7/23/2023 10:06 PM  End time: 7/23/2023 10:17 PM  Surgery related to: Active Labor    Staffing  Performing Provider: John Robbins CRNA  Authorizing Provider: Donn Baker Jr., MD        Preanesthetic Checklist  Completed: patient identified, IV checked, site marked, risks and benefits discussed, surgical consent, monitors and equipment checked, pre-op evaluation, timeout performed, anesthesia consent given, hand hygiene performed and patient being monitored  Preparation  Patient position: sitting  Prep: ChloraPrep  Patient monitoring: Pulse Ox and Blood Pressure  Reason for block: primary anesthetic   Epidural  Skin Anesthetic: lidocaine 1%  Skin Wheal: 3 mL  Administration type: continuous  Approach: midline  Interspace: L3-4    Injection technique: BRUNO saline  Needle and Epidural Catheter  Needle type: Tuohy   Needle gauge: 17  Needle length: 7.0 inches  Needle insertion depth: 5 cm  Catheter type: multi-orifice  Catheter size: 18 G  Catheter at skin depth: 12 cm  Insertion Attempts: 1  Test dose: 3 mL of lidocaine 1.5% with Epi 1-to-200,000  Additional Documentation: incremental injection, no paresthesia on injection, no significant pain on injection, negative aspiration for heme and CSF and no significant complaints from patient  Needle localization: anatomical landmarks  Assessment  Upper dermatomal levels - Left: T10  Right: T10   Dermatomal levels determined by alcohol wipe  Ease of block: easy  Patient's tolerance of the procedure: comfortable throughout block and no complaints  Additional Notes  States comfortable No inadvertent dural puncture with Tuohy.  Dural puncture not performed with spinal needle

## 2023-07-25 LAB
BASOPHILS # BLD AUTO: 0.04 X10(3)/MCL
BASOPHILS NFR BLD AUTO: 0.3 %
EOSINOPHIL # BLD AUTO: 0.09 X10(3)/MCL (ref 0–0.9)
EOSINOPHIL NFR BLD AUTO: 0.6 %
ERYTHROCYTE [DISTWIDTH] IN BLOOD BY AUTOMATED COUNT: 13 % (ref 11.5–17)
HCT VFR BLD AUTO: 31.2 % (ref 37–47)
HGB BLD-MCNC: 10.7 G/DL (ref 12–16)
IMM GRANULOCYTES # BLD AUTO: 0.1 X10(3)/MCL (ref 0–0.04)
IMM GRANULOCYTES NFR BLD AUTO: 0.7 %
LYMPHOCYTES # BLD AUTO: 2.38 X10(3)/MCL (ref 0.6–4.6)
LYMPHOCYTES NFR BLD AUTO: 16.5 %
MCH RBC QN AUTO: 31.8 PG (ref 27–31)
MCHC RBC AUTO-ENTMCNC: 34.3 G/DL (ref 33–36)
MCV RBC AUTO: 92.9 FL (ref 80–94)
MONOCYTES # BLD AUTO: 1.25 X10(3)/MCL (ref 0.1–1.3)
MONOCYTES NFR BLD AUTO: 8.6 %
NEUTROPHILS # BLD AUTO: 10.6 X10(3)/MCL (ref 2.1–9.2)
NEUTROPHILS NFR BLD AUTO: 73.3 %
NRBC BLD AUTO-RTO: 0 %
PLATELET # BLD AUTO: 260 X10(3)/MCL (ref 130–400)
PMV BLD AUTO: 9.5 FL (ref 7.4–10.4)
RBC # BLD AUTO: 3.36 X10(6)/MCL (ref 4.2–5.4)
WBC # SPEC AUTO: 14.46 X10(3)/MCL (ref 4.5–11.5)

## 2023-07-25 PROCEDURE — 25000003 PHARM REV CODE 250: Performed by: ANESTHESIOLOGY

## 2023-07-25 PROCEDURE — 11000001 HC ACUTE MED/SURG PRIVATE ROOM

## 2023-07-25 PROCEDURE — 85025 COMPLETE CBC W/AUTO DIFF WBC: CPT | Performed by: OBSTETRICS & GYNECOLOGY

## 2023-07-25 PROCEDURE — 25000003 PHARM REV CODE 250: Performed by: OBSTETRICS & GYNECOLOGY

## 2023-07-25 RX ORDER — SERTRALINE HYDROCHLORIDE 50 MG/1
50 TABLET, FILM COATED ORAL DAILY
Status: DISCONTINUED | OUTPATIENT
Start: 2023-07-25 | End: 2023-07-26 | Stop reason: HOSPADM

## 2023-07-25 RX ORDER — SERTRALINE HYDROCHLORIDE 50 MG/1
50 TABLET, FILM COATED ORAL DAILY
Status: DISCONTINUED | OUTPATIENT
Start: 2023-07-25 | End: 2023-07-25

## 2023-07-25 RX ORDER — DIAZEPAM 5 MG/1
5 TABLET ORAL ONCE
Status: DISCONTINUED | OUTPATIENT
Start: 2023-07-25 | End: 2023-07-26 | Stop reason: HOSPADM

## 2023-07-25 RX ORDER — DIAZEPAM 5 MG/1
5 TABLET ORAL ONCE
Status: DISCONTINUED | OUTPATIENT
Start: 2023-07-25 | End: 2023-07-25

## 2023-07-25 RX ADMIN — IBUPROFEN 600 MG: 600 TABLET, FILM COATED ORAL at 02:07

## 2023-07-25 RX ADMIN — DOCUSATE SODIUM 200 MG: 100 CAPSULE, LIQUID FILLED ORAL at 08:07

## 2023-07-25 RX ADMIN — OXYCODONE HYDROCHLORIDE 10 MG: 5 TABLET ORAL at 05:07

## 2023-07-25 RX ADMIN — IBUPROFEN 600 MG: 600 TABLET, FILM COATED ORAL at 08:07

## 2023-07-25 RX ADMIN — OXYCODONE HYDROCHLORIDE 10 MG: 5 TABLET ORAL at 11:07

## 2023-07-25 RX ADMIN — SERTRALINE HYDROCHLORIDE 50 MG: 50 TABLET ORAL at 05:07

## 2023-07-25 NOTE — ANESTHESIA POSTPROCEDURE EVALUATION
Anesthesia Post Evaluation    Patient: Kerri Ramsey    Procedure(s) Performed: * No procedures listed *    Final Anesthesia Type: epidural      Patient location during evaluation: PACU  Patient participation: Yes- Able to Participate  Level of consciousness: awake and alert  Post-procedure vital signs: reviewed and stable  Pain management: adequate  Airway patency: patent  QUIANA mitigation strategies: Use of major conduction anesthesia (spinal/epidural) or peripheral nerve block  PONV status at discharge: No PONV  Anesthetic complications: no      Cardiovascular status: hemodynamically stable  Respiratory status: unassisted, spontaneous ventilation and room air  Hydration status: euvolemic  Follow-up not needed.  Comments: Denies problem c spinal or epidural anesthesia           Vitals Value Taken Time   /72 07/25/23 1607   Temp 36.6 °C (97.9 °F) 07/25/23 1607   Pulse 73 07/25/23 1607   Resp 20 07/25/23 1607   SpO2 99 % 07/24/23 2051   Vitals shown include unvalidated device data.      No case tracking events are documented in the log.      Pain/Jimmy Score: Pain Rating Prior to Med Admin: 3 (7/25/2023  2:40 PM)  Pain Rating Post Med Admin: 4 (7/25/2023  8:33 AM)

## 2023-07-25 NOTE — L&D DELIVERY NOTE
[facility]  Vaginal Delivery   Operative Note    SUMMARY     Normal spontaneous vaginal delivery of live infant, skin to skin was unable to be performed due to Mec .  Infant delivered position OA over intact perineum.  Nuchal cord: No.    Manual delivery of placenta and IV pitocin given noting good uterine tone.  1st degree laceration noted.  Patient tolerated delivery well. Sponge needle and lap counted correctly x2.    Indications: <principal problem not specified>  Pregnancy complicated by:   Patient Active Problem List   Diagnosis    Anxiety    Chronic nonintractable headache    Kyphosis of cervical region    TMJ (dislocation of temporomandibular joint)    39 weeks gestation of pregnancy    False labor after 37 completed weeks of gestation    Normal labor     Admitting GA: 40w0d    Delivery Information for Donta Ramsey    Birth information:  YOB: [birth year]23   Time of birth: 5:14 PM   Sex: male   Head Delivery Date/Time: 7/24/2023  5:14 PM   Delivery type: Vaginal, Spontaneous   Gestational Age: 40w0d        Delivery Providers    Delivering clinician: Richard Serrato MD   Provider Role    Deena Allan RN Registered Nurse    Nelsy Loja RN Registered Nurse    Damaris Reaves RN Registered Nurse    Cecily Villanueva RN Registered Nurse    Valentina Bocanegra RN Registered Nurse    Debbie Bueno, EVERARDO Respiratory Therapist    Leslie Griffin, RN Registered Nurse              Measurements    Weight: 3710 g  Weight (lbs): 8 lb 2.9 oz  Length: 53.3 cm  Length (in): 21"  Head circumference: 34.3 cm         Apgars    Living status: Living  Apgars:  1 min.:  5 min.:  10 min.:  15 min.:  20 min.:    Skin color:  0  1       Heart rate:  2  2       Reflex irritability:  2  2       Muscle tone:  1  2       Respiratory effort:  1  2       Total:  6  9       Apgars assigned by: JESSICA GRIFFIN RN         Operative Delivery    Forceps attempted?: No  Vacuum extractor " attempted?: No         Shoulder Dystocia    Shoulder dystocia present?: No           Presentation    Presentation: Vertex  Position: Left Occiput Anterior           Interventions/Resuscitation    Method: Bulb Suctioning, Tactile Stimulation, Deep Suctioning, CPAP, NICU Attended       Cord    Vessels: 3 vessels  Complications: None  Delayed Cord Clamping?: Yes  Cord Clamped Date/Time: 2023  5:15 PM  Cord Blood Disposition: Sent with Baby  Gases Sent?: No  Stem Cell Collection (by MD): No       Placenta    Placenta delivery date/time: 2023 1716  Placenta removal: Spontaneous  Placenta appearance: Intact  Placenta disposition: Discarded           Labor Events:       labor: No     Labor Onset Date/Time: 2023 20:00     Dilation Complete Date/Time: 2023 15:45     Start Pushing Date/Time: 2023 15:45     Rupture Date/Time: 23  1154         Rupture type: SRM (Spontaneous Rupture)         Fluid Amount:       Fluid Color: Meconium Moderate, Brown       steroids: None     Antibiotics given for GBS: No     Induction: none     Indications for induction:        Augmentation: oxytocin     Indications for augmentation: Ineffective Contraction Pattern     Labor complications: None     Additional complications:          Cervical ripening:                     Delivery:      Episiotomy: None     Indication for Episiotomy:       Perineal Lacerations: 1st Repaired:  Yes   Periurethral Laceration:   Repaired:     Labial Laceration:   Repaired:     Sulcus Laceration:   Repaired:     Vaginal Laceration:   Repaired:     Cervical Laceration:   Repaired:     Repair suture:       Repair # of packets: 1     Last Value - EBL - Nursing (mL):       Sum - EBL - Nursing (mL): 0     Last Value - EBL - Anesthesia (mL):      Calculated QBL (mL): 175      Vaginal Sweep Performed: Yes     Surgicount Correct: Yes       Other providers:       Anesthesia    Method: Epidural          Details (if  applicable):  Trial of Labor      Categorization:      Priority:     Indications for :     Incision Type:       Additional  information:  Forceps:    Vacuum:    Breech:    Observed anomalies    Other (Comments): NICU attended. Infant stunned at time of delivery. At 2 min of life, CPAP started x2 min. Apgars assigned 6/9 per NICU RN.

## 2023-07-25 NOTE — PLAN OF CARE
"  Problem:  Fall Injury Risk  Goal: Absence of Fall, Infant Drop and Related Injury  2023 by Jesus Talbert RN  Outcome: Ongoing, Progressing  2023 by Jesus Talbert RN  Outcome: Ongoing, Progressing     Problem: Adult Inpatient Plan of Care  Goal: Plan of Care Review  2023 by Jesus Talbert RN  Outcome: Ongoing, Progressing  2023 by Jesus Talbert RN  Outcome: Ongoing, Progressing  Goal: Patient-Specific Goal (Individualized)  2023 by Jesus Talbert RN  Outcome: Ongoing, Progressing  Flowsheets (Taken 2023)  Individualized Care Needs: "I want to sleep tonight."  2023 by Jesus Talbert RN  Outcome: Ongoing, Progressing  Goal: Absence of Hospital-Acquired Illness or Injury  2023 by Jesus Talbert RN  Outcome: Ongoing, Progressing  2023 by Jesus Talbert RN  Outcome: Ongoing, Progressing  Goal: Optimal Comfort and Wellbeing  2023 by Jesus Talbert RN  Outcome: Ongoing, Progressing  2023 by Jesus Talbert RN  Outcome: Ongoing, Progressing  Goal: Readiness for Transition of Care  2023 by Jesus Talbert RN  Outcome: Ongoing, Progressing  2023 by Jesus Talbert RN  Outcome: Ongoing, Progressing     Problem: Infection  Goal: Absence of Infection Signs and Symptoms  2023 by Jesus Talbert RN  Outcome: Ongoing, Progressing  2023 by Jesus Talbert RN  Outcome: Ongoing, Progressing     Problem: Bleeding (Labor)  Goal: Hemostasis  2023 by Jesus Talbert RN  Outcome: Ongoing, Progressing  2023 by Jesus Talbert RN  Outcome: Ongoing, Progressing     Problem: Change in Fetal Wellbeing (Labor)  Goal: Stable Fetal Wellbeing  2023 by Jesus Talbert RN  Outcome: Ongoing, Progressing  2023 by Jesus Talbert RN  Outcome: Ongoing, Progressing     Problem: Delayed Labor Progression " (Labor)  Goal: Effective Progression to Delivery  7/25/2023 0026 by Jesus Talbert RN  Outcome: Ongoing, Progressing  7/25/2023 0026 by Jesus Talbert RN  Outcome: Ongoing, Progressing     Problem: Infection (Labor)  Goal: Absence of Infection Signs and Symptoms  7/25/2023 0026 by Jesus Talbert RN  Outcome: Ongoing, Progressing  7/25/2023 0026 by Jesus Talbert RN  Outcome: Ongoing, Progressing     Problem: Labor Pain (Labor)  Goal: Acceptable Pain Control  7/25/2023 0026 by Jesus Talbert RN  Outcome: Ongoing, Progressing  7/25/2023 0026 by Jesus Talbert RN  Outcome: Ongoing, Progressing     Problem: Uterine Tachysystole (Labor)  Goal: Normal Uterine Contraction Pattern  7/25/2023 0026 by Jesus Talbert RN  Outcome: Ongoing, Progressing  7/25/2023 0026 by Jesus Talbert RN  Outcome: Ongoing, Progressing

## 2023-07-26 VITALS
SYSTOLIC BLOOD PRESSURE: 126 MMHG | OXYGEN SATURATION: 97 % | RESPIRATION RATE: 18 BRPM | TEMPERATURE: 98 F | HEART RATE: 85 BPM | DIASTOLIC BLOOD PRESSURE: 74 MMHG

## 2023-07-26 PROBLEM — Z34.90 PREGNANCY: Status: ACTIVE | Noted: 2023-07-26

## 2023-07-26 PROCEDURE — 25000003 PHARM REV CODE 250: Performed by: OBSTETRICS & GYNECOLOGY

## 2023-07-26 RX ADMIN — OXYCODONE HYDROCHLORIDE 10 MG: 5 TABLET ORAL at 06:07

## 2023-07-26 RX ADMIN — IBUPROFEN 600 MG: 600 TABLET, FILM COATED ORAL at 11:07

## 2023-07-26 RX ADMIN — PRENATAL VITAMINS-IRON FUMARATE 27 MG IRON-FOLIC ACID 0.8 MG TABLET 1 TABLET: at 08:07

## 2023-07-26 RX ADMIN — SERTRALINE HYDROCHLORIDE 50 MG: 50 TABLET ORAL at 08:07

## 2023-07-26 RX ADMIN — DOCUSATE SODIUM 200 MG: 100 CAPSULE, LIQUID FILLED ORAL at 11:07

## 2023-07-26 RX ADMIN — IBUPROFEN 600 MG: 600 TABLET, FILM COATED ORAL at 02:07

## 2023-07-26 RX ADMIN — OXYCODONE HYDROCHLORIDE 10 MG: 5 TABLET ORAL at 12:07

## 2023-07-26 RX ADMIN — OXYCODONE HYDROCHLORIDE 10 MG: 5 TABLET ORAL at 01:07

## 2023-07-26 NOTE — PLAN OF CARE
Problem:  Fall Injury Risk  Goal: Absence of Fall, Infant Drop and Related Injury  Outcome: Ongoing, Progressing     Problem: Adult Inpatient Plan of Care  Goal: Plan of Care Review  Outcome: Ongoing, Progressing  Goal: Patient-Specific Goal (Individualized)  Outcome: Ongoing, Progressing  Goal: Absence of Hospital-Acquired Illness or Injury  Outcome: Ongoing, Progressing  Goal: Optimal Comfort and Wellbeing  Outcome: Ongoing, Progressing  Goal: Readiness for Transition of Care  Outcome: Ongoing, Progressing     Problem: Infection  Goal: Absence of Infection Signs and Symptoms  Outcome: Ongoing, Progressing     Problem: Adjustment to Role Transition (Postpartum Vaginal Delivery)  Goal: Successful Maternal Role Transition  Outcome: Ongoing, Progressing     Problem: Bleeding (Postpartum Vaginal Delivery)  Goal: Hemostasis  Outcome: Ongoing, Progressing     Problem: Infection (Postpartum Vaginal Delivery)  Goal: Absence of Infection Signs/Symptoms  Outcome: Ongoing, Progressing     Problem: Pain (Postpartum Vaginal Delivery)  Goal: Acceptable Pain Control  Outcome: Ongoing, Progressing     Problem: Urinary Retention (Postpartum Vaginal Delivery)  Goal: Effective Urinary Elimination  Outcome: Ongoing, Progressing     Problem: Breastfeeding  Goal: Effective Breastfeeding  Outcome: Ongoing, Progressing

## 2023-07-26 NOTE — PLAN OF CARE
Problem:  Fall Injury Risk  Goal: Absence of Fall, Infant Drop and Related Injury  2023 by Tammy Pennington RN  Outcome: Ongoing, Progressing  2023 by Tammy Pennington RN  Outcome: Ongoing, Progressing     Problem: Adult Inpatient Plan of Care  Goal: Plan of Care Review  2023 by Tammy Pennington RN  Outcome: Ongoing, Progressing  2023 by Tammy Pennington RN  Outcome: Ongoing, Progressing  Goal: Patient-Specific Goal (Individualized)  2023 by Tammy Pennington RN  Outcome: Ongoing, Progressing  2023 by Tammy Pennington RN  Outcome: Ongoing, Progressing  Goal: Absence of Hospital-Acquired Illness or Injury  2023 by Tammy Pennington RN  Outcome: Ongoing, Progressing  2023 by Tammy Pennington RN  Outcome: Ongoing, Progressing  Goal: Optimal Comfort and Wellbeing  2023 by Tammy Pennington RN  Outcome: Ongoing, Progressing  2023 by Tammy Pennington RN  Outcome: Ongoing, Progressing  Goal: Readiness for Transition of Care  2023 by Tammy Pennington RN  Outcome: Ongoing, Progressing  2023 by Tammy Pennington RN  Outcome: Ongoing, Progressing     Problem: Infection  Goal: Absence of Infection Signs and Symptoms  2023 by Tammy Pennington RN  Outcome: Ongoing, Progressing  2023 by Tammy Pennington RN  Outcome: Ongoing, Progressing     Problem: Bleeding (Labor)  Goal: Hemostasis  2023 by Tammy Pennington RN  Outcome: Ongoing, Progressing  2023 by Tammy Pennington RN  Outcome: Ongoing, Progressing     Problem: Change in Fetal Wellbeing (Labor)  Goal: Stable Fetal Wellbeing  2023 by Tammy Pennington RN  Outcome: Ongoing, Progressing  2023 by Tammy Pennington RN  Outcome: Ongoing, Progressing     Problem: Delayed Labor Progression (Labor)  Goal: Effective Progression to Delivery  2023 by Tammy Pennington RN  Outcome: Ongoing, Progressing  2023  by Tammy Pennington RN  Outcome: Ongoing, Progressing     Problem: Infection (Labor)  Goal: Absence of Infection Signs and Symptoms  7/25/2023 1950 by Tammy Pennington RN  Outcome: Ongoing, Progressing  7/25/2023 1950 by Tammy Pennington RN  Outcome: Ongoing, Progressing     Problem: Labor Pain (Labor)  Goal: Acceptable Pain Control  7/25/2023 1950 by Tammy Pennington RN  Outcome: Ongoing, Progressing  7/25/2023 1950 by Tammy Pennington RN  Outcome: Ongoing, Progressing     Problem: Uterine Tachysystole (Labor)  Goal: Normal Uterine Contraction Pattern  7/25/2023 1950 by Tammy Pennington RN  Outcome: Ongoing, Progressing  7/25/2023 1950 by Tammy Pennington RN  Outcome: Ongoing, Progressing

## 2023-07-29 NOTE — DISCHARGE SUMMARY
[facility] - 3rd Floor Mother/Baby Unit  Obstetrics  Discharge Summary      Patient Name: Kerri Ramsey  MRN: [id number]  Admission Date: 7/23/2023  Hospital Length of Stay: 3 days  Discharge Date and Time:  07/29/2023 8:41 AM  Attending Physician: No att. providers found   Discharging Provider: Richard Serrato MD   Primary Care Provider: RACHEAL NICHOLAS DO    HPI: No notes on file        * No surgery found *     Hospital Course:   No notes on file         Final Active Diagnoses:    Diagnosis Date Noted POA    PRINCIPAL PROBLEM:  Pregnancy [Z34.90] 07/26/2023 Not Applicable    Normal labor [O80, Z37.9] 07/23/2023 Not Applicable      Problems Resolved During this Admission:        Significant Diagnostic Studies: N/A      Feeding Method: breast    Immunizations     Date Immunization Status Dose Route/Site Given by    07/26/23 1732 MMR Deleted 0.5 mL Subcutaneous/     07/26/23 1732 Tdap Deleted 0.5 mL Intramuscular/           Delivery:    Episiotomy: None   Lacerations: 1st   Repair suture:     Repair # of packets: 1   Blood loss (ml):       Birth information:  YOB: 2021   Time of birth: 5:14 PM   Sex: male   Delivery type: Vaginal, Spontaneous   Gestational Age: 40w0d    Delivery Clinician:      Other providers:       Additional  information:  Forceps:    Vacuum:    Breech:    Observed anomalies      Living?:           APGARS  One minute Five minutes Ten minutes   Skin color:         Heart rate:         Grimace:         Muscle tone:         Breathing:         Totals: 6  9        Placenta: Delivered:       appearance    Pending Diagnostic Studies:     None          Discharged Condition: No complaints,VSSAF uterus firm, ready to go home    Disposition: Home or Self Care    Follow Up:6weeks   Follow-up Information     Richard Serrato MD. Schedule an appointment as soon as possible for a visit in 6 week(s).    Specialty: Obstetrics and Gynecology  Contact information:  [id number] [address]  [phone]                       Patient Instructions:   No discharge procedures on file.  Medications:  Discharge Medication List as of 7/26/2023  3:12 PM      CONTINUE these medications which have NOT CHANGED    Details   sertraline (ZOLOFT) 50 MG tablet Take 50 mg by mouth once daily., Historical Med      sumatriptan (IMITREX) 50 MG tablet Take 1 tablet (50 mg total) by mouth once. for 1 dose, Starting Fri 7/8/2022, Normal             Richard Serrato MD  Obstetrics  [facility] - 3rd Floor Mother/Baby Unit

## 2023-08-08 ENCOUNTER — APPOINTMENT (OUTPATIENT)
Dept: LAB | Facility: HOSPITAL | Age: 38
End: 2023-08-08
Attending: OBSTETRICS & GYNECOLOGY
Payer: COMMERCIAL

## 2023-08-08 DIAGNOSIS — N39.0 URINARY TRACT INFECTION, SITE NOT SPECIFIED: Primary | ICD-10-CM

## 2023-08-08 LAB
APPEARANCE UR: CLEAR
BACTERIA #/AREA URNS AUTO: NORMAL /HPF
BILIRUB UR QL STRIP.AUTO: NEGATIVE
COLOR UR: YELLOW
GLUCOSE UR QL STRIP.AUTO: NEGATIVE
KETONES UR QL STRIP.AUTO: NEGATIVE
LEUKOCYTE ESTERASE UR QL STRIP.AUTO: ABNORMAL
NITRITE UR QL STRIP.AUTO: NEGATIVE
PH UR STRIP.AUTO: 5.5 [PH]
PROT UR QL STRIP.AUTO: NEGATIVE
RBC #/AREA URNS AUTO: <5 /HPF
RBC UR QL AUTO: ABNORMAL
SP GR UR STRIP.AUTO: 1.01 (ref 1–1.03)
SQUAMOUS #/AREA URNS AUTO: <5 /HPF
UROBILINOGEN UR STRIP-ACNC: 0.2
WBC #/AREA URNS AUTO: <5 /HPF

## 2023-08-08 PROCEDURE — 81001 URINALYSIS AUTO W/SCOPE: CPT

## 2023-08-08 PROCEDURE — 87088 URINE BACTERIA CULTURE: CPT

## 2023-08-08 NOTE — H&P
[facility] - 3rd Floor Mother/Baby Unit  Obstetrics  History & Physical    Patient Name: Kerri Moura  MRN: [id number]  Admission Date: 2023  Primary Care Provider: Alisha Amador DO    Subjective:     Principal Problem:Pregnancy    History of Present Illness: PaT    Obstetric HPI:  Patient reports Frequency: Every 7 minutes contractions, active fetal movement, No vaginal bleeding , No loss of fluid     This pregnancy has been complicated by no issues    OB History    Para Term  AB Living   2 1 1 0 1 1   SAB IAB Ectopic Multiple Live Births   1 0 0 0 1      # Outcome Date GA Lbr Michael/2nd Weight Sex Delivery Anes PTL Lv   2 Term 23 40w0d 19:45 / 01:29 3.71 kg (8 lb 2.9 oz) M Vag-Spont EPI N SERGIO      Name: DWAINE MOURA      Apgar1: 6  Apgar5: 9   1 SAB 2022             Past Medical History:   Diagnosis Date    Anxiety     Hepatitis C     Human papilloma virus     Migraine      Past Surgical History:   Procedure Laterality Date    TONSILLECTOMY  6 years old    TYMPANOSTOMY TUBE PLACEMENT      WISDOM TOOTH EXTRACTION         No medications prior to admission.       Review of patient's allergies indicates:  No Known Allergies     Family History       Problem Relation (Age of Onset)    Alcohol abuse Maternal Grandfather    Diabetes Mother    Hypertension Mother    Miscarriages / Stillbirths Mother, Sister          Tobacco Use    Smoking status: Former     Current packs/day: 0.00    Smokeless tobacco: Never   Substance and Sexual Activity    Alcohol use: Not Currently    Drug use: Not Currently    Sexual activity: Yes     Partners: Male     Birth control/protection: None     Review of Systems   Objective:     Vital Signs (Most Recent):  Temp: 98.4 °F (36.9 °C) (23 08)  Pulse: 85 (23 08)  Resp: 18 (23 1309)  BP: 126/74 (23 08)  SpO2: 97 % (23) Vital Signs (24h Range):           There is no height or weight on file to calculate  BMI.    FHT: 147Cat 1 (reassuring)  TOCO:  Q 7 minutes    Physical Exam    Cervix:  Dilation:  4  Effacement:  75%  Station: -1  Presentation: Vertex     Significant Labs:  Lab Results   Component Value Date    GROUPTRH A POS 2023       I have personallly reviewed all pertinent lab results from the last 24 hours.    Assessment/Plan:     38 y.o. female  at 40w0d for:    Active Diagnoses:    Diagnosis Date Noted POA    PRINCIPAL PROBLEM:  Pregnancy [Z34.90] 2023 Not Applicable    Normal labor [O80, Z37.9] 2023 Not Applicable      Problems Resolved During this Admission:       AHMET Serrato MD  Obstetrics  [facility] - 3rd Floor Mother/Baby Unit

## 2023-08-10 LAB — BACTERIA UR CULT: NORMAL

## 2023-10-23 PROBLEM — Z37.9 NORMAL LABOR: Status: RESOLVED | Noted: 2023-07-23 | Resolved: 2023-10-23

## 2024-07-01 PROBLEM — Z3A.39 39 WEEKS GESTATION OF PREGNANCY: Status: RESOLVED | Noted: 2023-07-23 | Resolved: 2024-07-01

## 2024-09-04 ENCOUNTER — LAB REQUISITION (OUTPATIENT)
Dept: LAB | Facility: HOSPITAL | Age: 39
End: 2024-09-04
Payer: COMMERCIAL

## 2024-09-04 DIAGNOSIS — R39.9 UNSPECIFIED SYMPTOMS AND SIGNS INVOLVING THE GENITOURINARY SYSTEM: ICD-10-CM

## 2024-09-04 LAB
BACTERIA #/AREA URNS AUTO: ABNORMAL /HPF
BILIRUB UR QL STRIP.AUTO: NEGATIVE
CLARITY UR: CLEAR
COLOR UR AUTO: ABNORMAL
GLUCOSE UR QL STRIP: NORMAL
HGB UR QL STRIP: ABNORMAL
KETONES UR QL STRIP: NEGATIVE
LEUKOCYTE ESTERASE UR QL STRIP: NEGATIVE
MUCOUS THREADS URNS QL MICRO: ABNORMAL /LPF
NITRITE UR QL STRIP: NEGATIVE
PH UR STRIP: 6 [PH]
PROT UR QL STRIP: NEGATIVE
RBC #/AREA URNS AUTO: ABNORMAL /HPF
SP GR UR STRIP.AUTO: 1.01 (ref 1–1.03)
SQUAMOUS #/AREA URNS LPF: ABNORMAL /HPF
UROBILINOGEN UR STRIP-ACNC: NORMAL
WBC #/AREA URNS AUTO: ABNORMAL /HPF

## 2024-09-04 PROCEDURE — 81001 URINALYSIS AUTO W/SCOPE: CPT | Performed by: OBSTETRICS & GYNECOLOGY

## 2024-09-04 PROCEDURE — 87086 URINE CULTURE/COLONY COUNT: CPT | Performed by: OBSTETRICS & GYNECOLOGY

## 2024-09-06 LAB — BACTERIA UR CULT: NO GROWTH

## 2025-01-07 ENCOUNTER — LAB VISIT (OUTPATIENT)
Dept: LAB | Facility: HOSPITAL | Age: 40
End: 2025-01-07
Attending: FAMILY MEDICINE
Payer: COMMERCIAL

## 2025-01-07 DIAGNOSIS — Z00.00 WELLNESS EXAMINATION: Primary | ICD-10-CM

## 2025-01-07 LAB
ALBUMIN SERPL-MCNC: 4 G/DL (ref 3.5–5)
ALBUMIN/GLOB SERPL: 1.4 RATIO (ref 1.1–2)
ALP SERPL-CCNC: 46 UNIT/L (ref 40–150)
ALT SERPL-CCNC: 22 UNIT/L (ref 0–55)
ANION GAP SERPL CALC-SCNC: 7 MEQ/L
AST SERPL-CCNC: 15 UNIT/L (ref 5–34)
BACTERIA #/AREA URNS AUTO: ABNORMAL /HPF
BILIRUB SERPL-MCNC: 0.3 MG/DL
BILIRUB UR QL STRIP.AUTO: NEGATIVE
BUN SERPL-MCNC: 12.4 MG/DL (ref 7–18.7)
CALCIUM SERPL-MCNC: 9.3 MG/DL (ref 8.4–10.2)
CHLORIDE SERPL-SCNC: 107 MMOL/L (ref 98–107)
CHOLEST SERPL-MCNC: 156 MG/DL
CHOLEST/HDLC SERPL: 3 {RATIO} (ref 0–5)
CLARITY UR: CLEAR
CO2 SERPL-SCNC: 26 MMOL/L (ref 22–29)
COLOR UR AUTO: YELLOW
CREAT SERPL-MCNC: 0.66 MG/DL (ref 0.55–1.02)
CREAT/UREA NIT SERPL: 19
ERYTHROCYTE [DISTWIDTH] IN BLOOD BY AUTOMATED COUNT: 12.1 % (ref 11.5–17)
EST. AVERAGE GLUCOSE BLD GHB EST-MCNC: 116.9 MG/DL
ESTRADIOL SERPL HS-MCNC: 91 PG/ML
GFR SERPLBLD CREATININE-BSD FMLA CKD-EPI: >60 ML/MIN/1.73/M2
GLOBULIN SER-MCNC: 2.8 GM/DL (ref 2.4–3.5)
GLUCOSE SERPL-MCNC: 93 MG/DL (ref 74–100)
GLUCOSE UR QL STRIP: NEGATIVE
HBA1C MFR BLD: 5.7 %
HCT VFR BLD AUTO: 36.1 % (ref 37–47)
HDLC SERPL-MCNC: 55 MG/DL (ref 35–60)
HGB BLD-MCNC: 12.2 G/DL (ref 12–16)
HGB UR QL STRIP: NEGATIVE
KETONES UR QL STRIP: NEGATIVE
LDLC SERPL CALC-MCNC: 92 MG/DL (ref 50–140)
LEUKOCYTE ESTERASE UR QL STRIP: NEGATIVE
MCH RBC QN AUTO: 30.6 PG (ref 27–31)
MCHC RBC AUTO-ENTMCNC: 33.8 G/DL (ref 33–36)
MCV RBC AUTO: 90.5 FL (ref 80–94)
MUCOUS THREADS URNS QL MICRO: ABNORMAL /LPF
NITRITE UR QL STRIP: NEGATIVE
NRBC BLD AUTO-RTO: 0 %
PH UR STRIP: 6 [PH]
PLATELET # BLD AUTO: 447 X10(3)/MCL (ref 130–400)
PMV BLD AUTO: 9.1 FL (ref 7.4–10.4)
POTASSIUM SERPL-SCNC: 4.4 MMOL/L (ref 3.5–5.1)
PROGEST SERPL-MCNC: 10.4 NG/ML
PROT SERPL-MCNC: 6.8 GM/DL (ref 6.4–8.3)
PROT UR QL STRIP: ABNORMAL
RBC # BLD AUTO: 3.99 X10(6)/MCL (ref 4.2–5.4)
RBC #/AREA URNS AUTO: ABNORMAL /HPF
SODIUM SERPL-SCNC: 140 MMOL/L (ref 136–145)
SP GR UR STRIP.AUTO: 1.03 (ref 1–1.03)
SQUAMOUS #/AREA URNS LPF: ABNORMAL /HPF
TRIGL SERPL-MCNC: 45 MG/DL (ref 37–140)
TSH SERPL-ACNC: 1.34 UIU/ML (ref 0.35–4.94)
UROBILINOGEN UR STRIP-ACNC: NORMAL
VLDLC SERPL CALC-MCNC: 9 MG/DL
WBC # BLD AUTO: 3.72 X10(3)/MCL (ref 4.5–11.5)
WBC #/AREA URNS AUTO: ABNORMAL /HPF

## 2025-01-07 PROCEDURE — 85027 COMPLETE CBC AUTOMATED: CPT

## 2025-01-07 PROCEDURE — 80061 LIPID PANEL: CPT

## 2025-01-07 PROCEDURE — 83036 HEMOGLOBIN GLYCOSYLATED A1C: CPT

## 2025-01-07 PROCEDURE — 36415 COLL VENOUS BLD VENIPUNCTURE: CPT

## 2025-01-07 PROCEDURE — 80053 COMPREHEN METABOLIC PANEL: CPT

## 2025-01-07 PROCEDURE — 81001 URINALYSIS AUTO W/SCOPE: CPT

## 2025-01-07 PROCEDURE — 84144 ASSAY OF PROGESTERONE: CPT

## 2025-01-07 PROCEDURE — 82670 ASSAY OF TOTAL ESTRADIOL: CPT

## 2025-01-07 PROCEDURE — 84443 ASSAY THYROID STIM HORMONE: CPT

## 2025-03-18 ENCOUNTER — LAB VISIT (OUTPATIENT)
Dept: LAB | Facility: HOSPITAL | Age: 40
End: 2025-03-18
Attending: STUDENT IN AN ORGANIZED HEALTH CARE EDUCATION/TRAINING PROGRAM
Payer: COMMERCIAL

## 2025-03-18 DIAGNOSIS — N91.2 ABSENCE OF MENSTRUATION: Primary | ICD-10-CM

## 2025-03-18 LAB
B-HCG FREE SERPL-ACNC: 11.57 MIU/ML
PROGEST SERPL-MCNC: 3.9 NG/ML

## 2025-03-18 PROCEDURE — 84702 CHORIONIC GONADOTROPIN TEST: CPT

## 2025-03-18 PROCEDURE — 84144 ASSAY OF PROGESTERONE: CPT

## 2025-03-18 PROCEDURE — 36415 COLL VENOUS BLD VENIPUNCTURE: CPT

## 2025-03-20 ENCOUNTER — LAB VISIT (OUTPATIENT)
Dept: LAB | Facility: HOSPITAL | Age: 40
End: 2025-03-20
Attending: OBSTETRICS & GYNECOLOGY
Payer: COMMERCIAL

## 2025-03-20 DIAGNOSIS — N91.2 ABSENCE OF MENSTRUATION: Primary | ICD-10-CM

## 2025-03-20 LAB — B-HCG FREE SERPL-ACNC: 4.99 MIU/ML

## 2025-03-20 PROCEDURE — 84702 CHORIONIC GONADOTROPIN TEST: CPT

## 2025-03-20 PROCEDURE — 36415 COLL VENOUS BLD VENIPUNCTURE: CPT

## 2025-03-23 ENCOUNTER — HOSPITAL ENCOUNTER (EMERGENCY)
Facility: HOSPITAL | Age: 40
Discharge: HOME | End: 2025-03-23
Attending: STUDENT IN AN ORGANIZED HEALTH CARE EDUCATION/TRAINING PROGRAM
Payer: COMMERCIAL

## 2025-03-23 VITALS
OXYGEN SATURATION: 100 % | SYSTOLIC BLOOD PRESSURE: 117 MMHG | RESPIRATION RATE: 16 BRPM | WEIGHT: 155 LBS | TEMPERATURE: 98 F | DIASTOLIC BLOOD PRESSURE: 67 MMHG | BODY MASS INDEX: 24.91 KG/M2 | HEIGHT: 66 IN | HEART RATE: 55 BPM

## 2025-03-23 DIAGNOSIS — O03.9 MISCARRIAGE: Primary | ICD-10-CM

## 2025-03-23 LAB
ALBUMIN SERPL-MCNC: 4.2 G/DL (ref 3.5–5)
ALBUMIN/GLOB SERPL: 1.2 RATIO (ref 1.1–2)
ALP SERPL-CCNC: 43 UNIT/L (ref 40–150)
ALT SERPL-CCNC: 13 UNIT/L (ref 0–55)
ANION GAP SERPL CALC-SCNC: 6 MEQ/L
AST SERPL-CCNC: 14 UNIT/L (ref 11–45)
B-HCG FREE SERPL-ACNC: <2.42 MIU/ML
B-HCG UR QL: NEGATIVE
BACTERIA #/AREA URNS AUTO: ABNORMAL /HPF
BASOPHILS # BLD AUTO: 0.03 X10(3)/MCL
BASOPHILS NFR BLD AUTO: 0.6 %
BILIRUB SERPL-MCNC: 0.2 MG/DL
BILIRUB UR QL STRIP.AUTO: NEGATIVE
BUN SERPL-MCNC: 16.3 MG/DL (ref 7–18.7)
CALCIUM SERPL-MCNC: 9.2 MG/DL (ref 8.4–10.2)
CHLORIDE SERPL-SCNC: 107 MMOL/L (ref 98–107)
CLARITY UR: CLEAR
CO2 SERPL-SCNC: 24 MMOL/L (ref 22–29)
COLOR UR AUTO: COLORLESS
CREAT SERPL-MCNC: 0.68 MG/DL (ref 0.55–1.02)
CREAT/UREA NIT SERPL: 24
EOSINOPHIL # BLD AUTO: 0.1 X10(3)/MCL (ref 0–0.9)
EOSINOPHIL NFR BLD AUTO: 2 %
ERYTHROCYTE [DISTWIDTH] IN BLOOD BY AUTOMATED COUNT: 11.9 % (ref 11.5–17)
GFR SERPLBLD CREATININE-BSD FMLA CKD-EPI: >60 ML/MIN/1.73/M2
GLOBULIN SER-MCNC: 3.5 GM/DL (ref 2.4–3.5)
GLUCOSE SERPL-MCNC: 115 MG/DL (ref 74–100)
GLUCOSE UR QL STRIP: NORMAL
HCT VFR BLD AUTO: 37.5 % (ref 37–47)
HGB BLD-MCNC: 12.8 G/DL (ref 12–16)
HGB UR QL STRIP: ABNORMAL
IMM GRANULOCYTES # BLD AUTO: 0.01 X10(3)/MCL (ref 0–0.04)
IMM GRANULOCYTES NFR BLD AUTO: 0.2 %
KETONES UR QL STRIP: NEGATIVE
LEUKOCYTE ESTERASE UR QL STRIP: NEGATIVE
LYMPHOCYTES # BLD AUTO: 2.01 X10(3)/MCL (ref 0.6–4.6)
LYMPHOCYTES NFR BLD AUTO: 40.3 %
MCH RBC QN AUTO: 31.3 PG (ref 27–31)
MCHC RBC AUTO-ENTMCNC: 34.1 G/DL (ref 33–36)
MCV RBC AUTO: 91.7 FL (ref 80–94)
MONOCYTES # BLD AUTO: 0.38 X10(3)/MCL (ref 0.1–1.3)
MONOCYTES NFR BLD AUTO: 7.6 %
MUCOUS THREADS URNS QL MICRO: ABNORMAL /LPF
NEUTROPHILS # BLD AUTO: 2.46 X10(3)/MCL (ref 2.1–9.2)
NEUTROPHILS NFR BLD AUTO: 49.3 %
NITRITE UR QL STRIP: NEGATIVE
NRBC BLD AUTO-RTO: 0 %
PH UR STRIP: 5.5 [PH]
PLATELET # BLD AUTO: 346 X10(3)/MCL (ref 130–400)
PMV BLD AUTO: 9 FL (ref 7.4–10.4)
POTASSIUM SERPL-SCNC: 3.6 MMOL/L (ref 3.5–5.1)
PROT SERPL-MCNC: 7.7 GM/DL (ref 6.4–8.3)
PROT UR QL STRIP: NEGATIVE
RBC # BLD AUTO: 4.09 X10(6)/MCL (ref 4.2–5.4)
RBC #/AREA URNS AUTO: ABNORMAL /HPF
SODIUM SERPL-SCNC: 137 MMOL/L (ref 136–145)
SP GR UR STRIP.AUTO: 1.02 (ref 1–1.03)
SQUAMOUS #/AREA URNS LPF: ABNORMAL /HPF
UROBILINOGEN UR STRIP-ACNC: NORMAL
WBC # BLD AUTO: 4.99 X10(3)/MCL (ref 4.5–11.5)
WBC #/AREA URNS AUTO: ABNORMAL /HPF

## 2025-03-23 PROCEDURE — 85025 COMPLETE CBC W/AUTO DIFF WBC: CPT

## 2025-03-23 PROCEDURE — 81001 URINALYSIS AUTO W/SCOPE: CPT

## 2025-03-23 PROCEDURE — 63600175 PHARM REV CODE 636 W HCPCS

## 2025-03-23 PROCEDURE — 96360 HYDRATION IV INFUSION INIT: CPT

## 2025-03-23 PROCEDURE — 84702 CHORIONIC GONADOTROPIN TEST: CPT

## 2025-03-23 PROCEDURE — 81025 URINE PREGNANCY TEST: CPT

## 2025-03-23 PROCEDURE — 80053 COMPREHEN METABOLIC PANEL: CPT

## 2025-03-23 PROCEDURE — 25000003 PHARM REV CODE 250

## 2025-03-23 PROCEDURE — 99285 EMERGENCY DEPT VISIT HI MDM: CPT | Mod: 25

## 2025-03-23 RX ORDER — HYDROCODONE BITARTRATE AND ACETAMINOPHEN 5; 325 MG/1; MG/1
1 TABLET ORAL
Refills: 0 | Status: COMPLETED | OUTPATIENT
Start: 2025-03-23 | End: 2025-03-23

## 2025-03-23 RX ORDER — ONDANSETRON 4 MG/1
4 TABLET, ORALLY DISINTEGRATING ORAL EVERY 6 HOURS PRN
Qty: 20 TABLET | Refills: 0 | Status: SHIPPED | OUTPATIENT
Start: 2025-03-23

## 2025-03-23 RX ORDER — HYDROCODONE BITARTRATE AND ACETAMINOPHEN 5; 325 MG/1; MG/1
1 TABLET ORAL EVERY 6 HOURS PRN
Qty: 12 TABLET | Refills: 0 | Status: SHIPPED | OUTPATIENT
Start: 2025-03-23 | End: 2025-03-26

## 2025-03-23 RX ADMIN — SODIUM CHLORIDE, POTASSIUM CHLORIDE, SODIUM LACTATE AND CALCIUM CHLORIDE 1000 ML: 600; 310; 30; 20 INJECTION, SOLUTION INTRAVENOUS at 02:03

## 2025-03-23 RX ADMIN — HYDROCODONE BITARTRATE AND ACETAMINOPHEN 1 TABLET: 5; 325 TABLET ORAL at 02:03

## 2025-03-23 NOTE — ED PROVIDER NOTES
Encounter Date: 3/23/2025       History     Chief Complaint   Patient presents with    Abdominal Cramping     Pt ambulatory to triage and presents via POV. States that she is having a miscarriage which was confirmed by her OB (md nell) by decreasing HCG levels in recent blood work done 25. Endorses generalized abd cramping that began Thursday but has increased since. Also states that she is having minimal vaginal bleeding which also began Thursday, generalized weakness, and vaginal irritation. , 4-5 weeks.      The patient is a 40 y.o. female who presents to the Emergency Department with a chief complaint of abdominal pain.  Patient states that she was recently told by her OBGYN that she was miscarrying.  She reports that she has been having generalized abdominal cramping is mild vaginal bleeding.  Symptoms began 1 week ago and have been worsening since onset. Her pain is currently rated as a 8/10 in severity and described as aching with no radiation. Associated symptoms include generalized weakness. Symptoms are aggravated with nothing and there are no alleviating factors. The patient denies nausea, vomiting, fever, or chills. She reports taking nothing prior to arrival with no relief of symptoms. No other reported symptoms at this time.      The history is provided by the patient. No  was used.   Abdominal Cramping  The primary symptoms of the illness include abdominal pain. The primary symptoms of the illness do not include fever, shortness of breath, nausea, vomiting or dysuria. The current episode started several days ago. The onset of the illness was gradual. The problem has not changed since onset.  The abdominal pain began more than 2 days ago. The pain came on gradually. The abdominal pain has been unchanged since its onset. The abdominal pain is generalized. The abdominal pain does not radiate. The severity of the abdominal pain is 8/10. The abdominal pain is  relieved by nothing.   The patient states that she believes she is currently not pregnant. The patient has not had a change in bowel habit. Symptoms associated with the illness do not include back pain.     Review of patient's allergies indicates:  No Known Allergies  Past Medical History:   Diagnosis Date    Anxiety     Hepatitis C     Human papilloma virus     Migraine      Past Surgical History:   Procedure Laterality Date    TONSILLECTOMY  6 years old    TYMPANOSTOMY TUBE PLACEMENT      WISDOM TOOTH EXTRACTION       Family History   Problem Relation Name Age of Onset    Diabetes Mother Briana Ye     Hypertension Mother Briana Ye     Miscarriages / Stillbirths Mother Briana Ye     Alcohol abuse Maternal Grandfather Eric Contreras     Miscarriages / Stillbirths Sister Neetu Causey      Social History[1]  Review of Systems   Constitutional:  Negative for fever.   HENT:  Negative for sore throat.    Respiratory:  Negative for shortness of breath.    Cardiovascular:  Negative for chest pain.   Gastrointestinal:  Positive for abdominal pain. Negative for nausea and vomiting.   Genitourinary:  Negative for dysuria.   Musculoskeletal:  Negative for back pain.   Skin:  Negative for rash.   Neurological:  Positive for weakness.   Hematological:  Does not bruise/bleed easily.   All other systems reviewed and are negative.      Physical Exam     Initial Vitals [03/23/25 1310]   BP Pulse Resp Temp SpO2   (!) 149/84 72 16 98.2 °F (36.8 °C) 99 %      MAP       --         Physical Exam    Nursing note and vitals reviewed.  Constitutional: She appears well-developed and well-nourished.   HENT:   Head: Normocephalic.   Right Ear: Hearing and tympanic membrane normal.   Left Ear: Hearing and tympanic membrane normal. Mouth/Throat: Uvula is midline, oropharynx is clear and moist and mucous membranes are normal.   Eyes: Conjunctivae and EOM are normal. Pupils are equal, round, and reactive to light.   Cardiovascular:   Normal rate, regular rhythm, normal heart sounds and normal pulses.           Pulmonary/Chest: Effort normal and breath sounds normal.   Abdominal: Abdomen is soft. Bowel sounds are normal. There is no abdominal tenderness.   Genitourinary:    Vagina normal.   Cervix exhibits no motion tenderness. Right adnexum displays no tenderness. Left adnexum displays no tenderness.    No vaginal discharge or tenderness.   No tenderness in the vagina.    Genitourinary Comments: Cervical os closed  Scant amount of blood in vaginal vault  Chaperone: RAMIRO Khan       Lymphadenopathy:     She has no cervical adenopathy.   Neurological: She is alert. GCS eye subscore is 4. GCS verbal subscore is 5. GCS motor subscore is 6.   Skin: Skin is warm, dry and intact. Capillary refill takes less than 2 seconds.         ED Course   Procedures  Labs Reviewed   COMPREHENSIVE METABOLIC PANEL - Abnormal       Result Value    Sodium 137      Potassium 3.6      Chloride 107      CO2 24      Glucose 115 (*)     Blood Urea Nitrogen 16.3      Creatinine 0.68      Calcium 9.2      Protein Total 7.7      Albumin 4.2      Globulin 3.5      Albumin/Globulin Ratio 1.2      Bilirubin Total 0.2      ALP 43      ALT 13      AST 14      eGFR >60      Anion Gap 6.0      BUN/Creatinine Ratio 24     URINALYSIS, REFLEX TO URINE CULTURE - Abnormal    Color, UA Colorless      Appearance, UA Clear      Specific Gravity, UA 1.016      pH, UA 5.5      Protein, UA Negative      Glucose, UA Normal      Ketones, UA Negative      Blood, UA 2+ (*)     Bilirubin, UA Negative      Urobilinogen, UA Normal      Nitrites, UA Negative      Leukocyte Esterase, UA Negative      RBC, UA 6-10 (*)     WBC, UA 0-5      Bacteria, UA None Seen      Squamous Epithelial Cells, UA Trace      Mucous, UA Trace (*)    CBC WITH DIFFERENTIAL - Abnormal    WBC 4.99      RBC 4.09 (*)     Hgb 12.8      Hct 37.5      MCV 91.7      MCH 31.3 (*)     MCHC 34.1      RDW 11.9      Platelet 346      MPV  9.0      Neut % 49.3      Lymph % 40.3      Mono % 7.6      Eos % 2.0      Basophil % 0.6      Imm Grans % 0.2      Neut # 2.46      Lymph # 2.01      Mono # 0.38      Eos # 0.10      Baso # 0.03      Imm Gran # 0.01      NRBC% 0.0     HCG, QUANTITATIVE - Normal    Beta HCG Quant <2.42     PREGNANCY TEST, URINE RAPID - Normal    hCG Qualitative, Urine Negative     CBC W/ AUTO DIFFERENTIAL    Narrative:     The following orders were created for panel order CBC auto differential.  Procedure                               Abnormality         Status                     ---------                               -----------         ------                     CBC with Differential[[id number]]       Abnormal            Final result                 Please view results for these tests on the individual orders.          Imaging Results              US PELVIS COMPLETE NON OB WITH DOPPLER (XPD) (Final result)  Result time 03/23/25 15:59:00      Final result by John Durant MD (03/23/25 15:59:00)                   Impression:      No ultrasound findings for retained products.      Electronically signed by: John Durant  Date:    03/23/2025  Time:    15:59               Narrative:    EXAMINATION:  US PELVIS COMPLETE NON OB WITH DOPPLER (XPD)    CLINICAL HISTORY:  recent miscarriage. abdominal pain;    TECHNIQUE:  Transabdominal and endovaginal ultrasound of the pelvis.    COMPARISON:  No relevant comparison studies available at the time of dictation.    FINDINGS:  Uterus has normal size.  No endometrial thickening or hypervascularity identified.    Suboptimal visualization of the right ovary due to bowel gas, but no ovarian abnormality appreciated.  No significant pelvic free fluid.    Measurements:    - Uterus: 7.9 x 4.2 x 4.1 cm    - Endometrial stripe: 4 mm thick    - Right Ovary: 2.3 x 1.8 x 1.4 cm    - Left Ovary: 2.1 x 1.5 x 1.8 cm                                       Medications   lactated ringers bolus 1,000 mL (0 mLs  Intravenous Stopped 3/23/25 1539)   HYDROcodone-acetaminophen 5-325 mg per tablet 1 tablet (1 tablet Oral Given 3/23/25 1440)     Medical Decision Making  The patient is a 40 y.o. female who presents to the Emergency Department with a chief complaint of abdominal pain.  Patient states that she was recently told by her OBGYN that she was miscarrying.  She reports that she has been having generalized abdominal cramping is mild vaginal bleeding.  Symptoms began 1 week ago and have been worsening since onset. Her pain is currently rated as a 8/10 in severity and described as aching with no radiation. Associated symptoms include generalized weakness. Symptoms are aggravated with nothing and there are no alleviating factors. The patient denies nausea, vomiting, fever, or chills. She reports taking nothing prior to arrival with no relief of symptoms. No other reported symptoms at this time.      Judging by the patient's chief complaint and pertinent history, the patient has the following possible differential diagnoses, including but not limited to the following.  Some of these are deemed to be lower likelihood and some more likely based on my physical exam and history combined with possible lab work and/or imaging studies.   Please see the pertinent studies, and refer to the HPI.  Some of these diagnoses will take further evaluation to fully rule out, perhaps as an outpatient and the patient was encouraged to follow up when discharged for more comprehensive evaluation.    Ectopic pregnancy, spontaneous/threatened , subchorionic hemorrhage, placenta previa, placental abruption, infection, trauma, menses, malignancy, dysfunctional uterine bleeding, fibroids, anticoagulation, medications     Problems Addressed:  Miscarriage: acute illness or injury    Amount and/or Complexity of Data Reviewed  Labs: ordered. Decision-making details documented in ED Course.  Radiology: ordered. Decision-making details documented in ED  Course.    Risk  Prescription drug management.               ED Course as of 03/23/25 1620   Sun Mar 23, 2025   1425 Beta HCG Quant: <2.42 [LM]   1431 Beta Hcg 11.57 5 days ago  [LM]   1604 US PELVIS COMPLETE NON OB WITH DOPPLER (XPD)  Impression:     No ultrasound findings for retained products.   [LM]   1609 Hemoglobin: 12.8 [LM]   1611 Hematocrit: 37.5 [LM]   1611 Urinalysis, Reflex to Urine Culture Urine, Clean Catch(!)  No signs of infection  [LM]      ED Course User Index  [LM] Francisco Chu NP                           Clinical Impression:  Final diagnoses:  [O03.9] Miscarriage (Primary)          ED Disposition Condition    Discharge Stable          ED Prescriptions       Medication Sig Dispense Start Date End Date Auth. Provider    HYDROcodone-acetaminophen (NORCO) 5-325 mg per tablet Take 1 tablet by mouth every 6 (six) hours as needed for Pain. 12 tablet 3/23/2025 3/26/2025 Francisco Chu NP    ondansetron (ZOFRAN-ODT) 4 MG TbDL Take 1 tablet (4 mg total) by mouth every 6 (six) hours as needed (nausea). 20 tablet 3/23/2025 -- Francisco Chu NP          Follow-up Information       Follow up With Specialties Details Why Contact Info    Richard Serrato MD Obstetrics and Gynecology Schedule an appointment as soon as possible for a visit   [address] [place] [id number]  [phone]               Francisco Chu NP  03/23/25 1620         [1]   Social History  Tobacco Use    Smoking status: Former    Smokeless tobacco: Never   Substance Use Topics    Alcohol use: Not Currently    Drug use: Not Currently        Francisco Chu NP  03/23/25 1621

## 2025-04-21 ENCOUNTER — LAB VISIT (OUTPATIENT)
Dept: LAB | Facility: HOSPITAL | Age: 40
End: 2025-04-21
Attending: OBSTETRICS & GYNECOLOGY
Payer: COMMERCIAL

## 2025-04-21 DIAGNOSIS — O09.291 PREVIOUS CHILD WITH ANOMALY, ANTEPARTUM, FIRST TRIMESTER: Primary | ICD-10-CM

## 2025-04-21 LAB
B-HCG FREE SERPL-ACNC: 20.84 MIU/ML
PROGEST SERPL-MCNC: 12 NG/ML

## 2025-04-21 PROCEDURE — 36415 COLL VENOUS BLD VENIPUNCTURE: CPT

## 2025-04-21 PROCEDURE — 84144 ASSAY OF PROGESTERONE: CPT

## 2025-04-21 PROCEDURE — 84702 CHORIONIC GONADOTROPIN TEST: CPT

## 2025-04-23 ENCOUNTER — LAB VISIT (OUTPATIENT)
Dept: LAB | Facility: HOSPITAL | Age: 40
End: 2025-04-23
Attending: OBSTETRICS & GYNECOLOGY
Payer: COMMERCIAL

## 2025-04-23 DIAGNOSIS — O09.291 PREVIOUS CHILD WITH ANOMALY, ANTEPARTUM, FIRST TRIMESTER: Primary | ICD-10-CM

## 2025-04-23 LAB — B-HCG FREE SERPL-ACNC: 76.86 MIU/ML

## 2025-04-23 PROCEDURE — 36415 COLL VENOUS BLD VENIPUNCTURE: CPT

## 2025-04-23 PROCEDURE — 84702 CHORIONIC GONADOTROPIN TEST: CPT

## 2025-04-30 ENCOUNTER — LAB VISIT (OUTPATIENT)
Dept: LAB | Facility: HOSPITAL | Age: 40
End: 2025-04-30
Attending: OBSTETRICS & GYNECOLOGY
Payer: COMMERCIAL

## 2025-04-30 DIAGNOSIS — O09.291 PREVIOUS CHILD WITH ANOMALY, ANTEPARTUM, FIRST TRIMESTER: Primary | ICD-10-CM

## 2025-04-30 LAB — B-HCG FREE SERPL-ACNC: 2523.04 MIU/ML

## 2025-04-30 PROCEDURE — 36415 COLL VENOUS BLD VENIPUNCTURE: CPT

## 2025-04-30 PROCEDURE — 84702 CHORIONIC GONADOTROPIN TEST: CPT

## 2025-05-05 ENCOUNTER — LAB VISIT (OUTPATIENT)
Dept: LAB | Facility: HOSPITAL | Age: 40
End: 2025-05-05
Attending: OBSTETRICS & GYNECOLOGY
Payer: COMMERCIAL

## 2025-05-05 DIAGNOSIS — O20.9 HEMORRHAGE BEFORE 22 WEEKS GESTATION: Primary | ICD-10-CM

## 2025-05-05 LAB — B-HCG FREE SERPL-ACNC: ABNORMAL MIU/ML

## 2025-05-05 PROCEDURE — 84702 CHORIONIC GONADOTROPIN TEST: CPT

## 2025-05-05 PROCEDURE — 36415 COLL VENOUS BLD VENIPUNCTURE: CPT

## 2025-06-10 ENCOUNTER — LAB VISIT (OUTPATIENT)
Dept: LAB | Facility: HOSPITAL | Age: 40
End: 2025-06-10
Attending: OBSTETRICS & GYNECOLOGY
Payer: COMMERCIAL

## 2025-06-10 DIAGNOSIS — Z34.80 PRENATAL CARE, SUBSEQUENT PREGNANCY: Primary | ICD-10-CM

## 2025-06-10 LAB
ERYTHROCYTE [DISTWIDTH] IN BLOOD BY AUTOMATED COUNT: 12.5 % (ref 11.5–17)
GROUP & RH: NORMAL
HBV SURFACE AG SERPL QL IA: NONREACTIVE
HCT VFR BLD AUTO: 35.8 % (ref 37–47)
HGB BLD-MCNC: 12 G/DL (ref 12–16)
HIV 1+2 AB+HIV1 P24 AG SERPL QL IA: NONREACTIVE
INDIRECT COOMBS: NORMAL
MCH RBC QN AUTO: 31 PG (ref 27–31)
MCHC RBC AUTO-ENTMCNC: 33.5 G/DL (ref 33–36)
MCV RBC AUTO: 92.5 FL (ref 80–94)
NRBC BLD AUTO-RTO: 0 %
PLATELET # BLD AUTO: 349 X10(3)/MCL (ref 130–400)
PMV BLD AUTO: 8.9 FL (ref 7.4–10.4)
RBC # BLD AUTO: 3.87 X10(6)/MCL (ref 4.2–5.4)
SPECIMEN OUTDATE: NORMAL
T PALLIDUM AB SER QL: NONREACTIVE
WBC # BLD AUTO: 6.68 X10(3)/MCL (ref 4.5–11.5)

## 2025-06-10 PROCEDURE — 87389 HIV-1 AG W/HIV-1&-2 AB AG IA: CPT

## 2025-06-10 PROCEDURE — 86762 RUBELLA ANTIBODY: CPT

## 2025-06-10 PROCEDURE — 85027 COMPLETE CBC AUTOMATED: CPT

## 2025-06-10 PROCEDURE — 87340 HEPATITIS B SURFACE AG IA: CPT

## 2025-06-10 PROCEDURE — 87086 URINE CULTURE/COLONY COUNT: CPT

## 2025-06-10 PROCEDURE — 86850 RBC ANTIBODY SCREEN: CPT | Performed by: OBSTETRICS & GYNECOLOGY

## 2025-06-10 PROCEDURE — 36415 COLL VENOUS BLD VENIPUNCTURE: CPT

## 2025-06-10 PROCEDURE — 85660 RBC SICKLE CELL TEST: CPT

## 2025-06-10 PROCEDURE — 86780 TREPONEMA PALLIDUM: CPT

## 2025-06-11 LAB
BACTERIA UR CULT: ABNORMAL
HGB S BLD QL SOLY: NEGATIVE

## 2025-06-12 LAB
RUBV IGG SERPL IA-ACNC: 3.7
RUBV IGG SERPL QL IA: POSITIVE

## 2025-06-19 ENCOUNTER — APPOINTMENT (OUTPATIENT)
Dept: LAB | Facility: HOSPITAL | Age: 40
End: 2025-06-19
Attending: OBSTETRICS & GYNECOLOGY
Payer: COMMERCIAL

## 2025-06-19 DIAGNOSIS — R35.0 URINARY FREQUENCY: Primary | ICD-10-CM

## 2025-06-19 LAB
BACTERIA #/AREA URNS AUTO: NORMAL /HPF
BILIRUB UR QL STRIP.AUTO: NEGATIVE
CLARITY UR: CLEAR
COLOR UR AUTO: COLORLESS
GLUCOSE UR QL STRIP: NORMAL
HGB UR QL STRIP: NEGATIVE
KETONES UR QL STRIP: NEGATIVE
LEUKOCYTE ESTERASE UR QL STRIP: NEGATIVE
NITRITE UR QL STRIP: NEGATIVE
PH UR STRIP: 6.5 [PH]
PROT UR QL STRIP: NEGATIVE
RBC #/AREA URNS AUTO: NORMAL /HPF
SP GR UR STRIP.AUTO: 1.02 (ref 1–1.03)
SQUAMOUS #/AREA URNS LPF: NORMAL /HPF
UROBILINOGEN UR STRIP-ACNC: NORMAL
WBC #/AREA URNS AUTO: NORMAL /HPF

## 2025-06-19 PROCEDURE — 87086 URINE CULTURE/COLONY COUNT: CPT

## 2025-06-19 PROCEDURE — 81015 MICROSCOPIC EXAM OF URINE: CPT

## 2025-06-21 LAB — BACTERIA UR CULT: NORMAL

## 2025-06-24 ENCOUNTER — HOSPITAL ENCOUNTER (EMERGENCY)
Facility: HOSPITAL | Age: 40
Discharge: LEFT BEFORE DISCHARGE | End: 2025-06-25
Payer: COMMERCIAL

## 2025-06-24 VITALS
OXYGEN SATURATION: 100 % | RESPIRATION RATE: 18 BRPM | WEIGHT: 165 LBS | HEIGHT: 66 IN | BODY MASS INDEX: 26.52 KG/M2 | DIASTOLIC BLOOD PRESSURE: 56 MMHG | TEMPERATURE: 98 F | SYSTOLIC BLOOD PRESSURE: 124 MMHG | HEART RATE: 70 BPM

## 2025-06-24 PROCEDURE — 99900041 HC LEFT WITHOUT BEING SEEN- EMERGENCY

## 2025-07-08 DIAGNOSIS — R51.9 HEADACHE: Primary | ICD-10-CM

## 2025-07-22 ENCOUNTER — OFFICE VISIT (OUTPATIENT)
Dept: NEUROLOGY | Facility: CLINIC | Age: 40
End: 2025-07-22
Payer: COMMERCIAL

## 2025-07-22 VITALS
SYSTOLIC BLOOD PRESSURE: 110 MMHG | WEIGHT: 172 LBS | HEIGHT: 66 IN | BODY MASS INDEX: 27.64 KG/M2 | DIASTOLIC BLOOD PRESSURE: 68 MMHG

## 2025-07-22 DIAGNOSIS — R51.9 CHRONIC DAILY HEADACHE: Primary | ICD-10-CM

## 2025-07-22 PROCEDURE — 3074F SYST BP LT 130 MM HG: CPT | Mod: CPTII,S$GLB,, | Performed by: NURSE PRACTITIONER

## 2025-07-22 PROCEDURE — 3044F HG A1C LEVEL LT 7.0%: CPT | Mod: CPTII,S$GLB,, | Performed by: NURSE PRACTITIONER

## 2025-07-22 PROCEDURE — 1159F MED LIST DOCD IN RCRD: CPT | Mod: CPTII,S$GLB,, | Performed by: NURSE PRACTITIONER

## 2025-07-22 PROCEDURE — 3008F BODY MASS INDEX DOCD: CPT | Mod: CPTII,S$GLB,, | Performed by: NURSE PRACTITIONER

## 2025-07-22 PROCEDURE — 3078F DIAST BP <80 MM HG: CPT | Mod: CPTII,S$GLB,, | Performed by: NURSE PRACTITIONER

## 2025-07-22 PROCEDURE — 99999 PR PBB SHADOW E&M-EST. PATIENT-LVL III: CPT | Mod: PBBFAC,,, | Performed by: NURSE PRACTITIONER

## 2025-07-22 PROCEDURE — 1160F RVW MEDS BY RX/DR IN RCRD: CPT | Mod: CPTII,S$GLB,, | Performed by: NURSE PRACTITIONER

## 2025-07-22 PROCEDURE — 99205 OFFICE O/P NEW HI 60 MIN: CPT | Mod: S$GLB,,, | Performed by: NURSE PRACTITIONER

## 2025-07-22 RX ORDER — BUTALBITAL, ACETAMINOPHEN AND CAFFEINE 50; 325; 40 MG/1; MG/1; MG/1
1 TABLET ORAL EVERY 6 HOURS PRN
COMMUNITY
Start: 2025-06-11

## 2025-07-22 NOTE — PROGRESS NOTES
Subjective     Patient ID: Kerri Ramsey is a 40 y.o. female.    Chief Complaint: Headache (New Pt - referred by Dr Serrato - Neuro Consult - Headaches)    HPI  16 weeks pregnant  MARCELINA: 1[birth year]  Longstanding headache history  Avg about 2-3 HA/wk pre-pregnancy  Gets worse during pregnancy & has increase in severity  Usually about a 3/10, but can be 10/10  Was on progesterone for 13 weeks  Has been getting better over the last 2 weeks or so  G4, P1  In the 2nd trimester  Currently has subchorionic hemorrhage     Prev tried &/or failed: zoloft, sumatriptan (rebound), fioricet (does not work), propranolol (took recently, but did not work), excedrin tension HA, tylenol    On mag glycinate, NAC & prenatal    Plans to breastfeed for about 6mo    Review of Systems  A 14pt ROS was reviewed & is negative unless otherwise documented in the HPI       Objective     Physical Exam  GENERAL: NAD, calm, cooperative, appropriate  Awake/alert  Well groomed  RESP: CTAB  HEART: RRR  No LE edema  MENTAL STATUS: oriented, follow commands reliably  SPEECH/LANGUAGE: clear, fluent  CN:  Perrla, eomi, vff, gaze conjugate  No tactile or motor facial asymmetry  Tongue protrudes midline  Motor: no focal weakness  Cerebellar: no tremor or dysmetria  Sensory: normal to tactile stim/vibration  DTRs: normal +2, symmetric  Gait: steady       Assessment and Plan     1. Chronic daily headache  -     Ambulatory referral/consult to Neurology      Suggest massage & essential oils  Cont mag glycinate  Stay hydrated  Hopefully she will continue to downward trend in HAs during her 2nd trimester  F/u prn (can come back after delivery if needed)    Sania Ness, [facility]       Follow up if symptoms worsen or fail to improve, for CDH.    Total visit time of >62 min includes time spent preparing to see the patient (chart review), obtaining and/or reviewing separately obtained history, performing an exam, counseling/educating the patient and/or  caregiver, ordering medications, tests or procedures, documenting clinical info in the EHR and care coordination.

## 2025-07-28 DIAGNOSIS — O09.521 MULTIGRAVIDA OF ADVANCED MATERNAL AGE IN FIRST TRIMESTER: Primary | ICD-10-CM

## 2025-07-28 DIAGNOSIS — B19.20 HEPATITIS C VIRUS INFECTION WITHOUT HEPATIC COMA, UNSPECIFIED CHRONICITY: ICD-10-CM

## 2025-07-29 ENCOUNTER — PROCEDURE VISIT (OUTPATIENT)
Dept: MATERNAL FETAL MEDICINE | Facility: CLINIC | Age: 40
End: 2025-07-29
Payer: COMMERCIAL

## 2025-07-29 ENCOUNTER — OFFICE VISIT (OUTPATIENT)
Dept: MATERNAL FETAL MEDICINE | Facility: CLINIC | Age: 40
End: 2025-07-29
Payer: COMMERCIAL

## 2025-07-29 VITALS
WEIGHT: 178.56 LBS | SYSTOLIC BLOOD PRESSURE: 129 MMHG | HEIGHT: 66 IN | BODY MASS INDEX: 28.7 KG/M2 | DIASTOLIC BLOOD PRESSURE: 72 MMHG | HEART RATE: 72 BPM

## 2025-07-29 DIAGNOSIS — B19.20 HEPATITIS C VIRUS INFECTION WITHOUT HEPATIC COMA, UNSPECIFIED CHRONICITY: ICD-10-CM

## 2025-07-29 DIAGNOSIS — O09.522 MULTIGRAVIDA OF ADVANCED MATERNAL AGE IN SECOND TRIMESTER: Primary | ICD-10-CM

## 2025-07-29 DIAGNOSIS — O99.342 MENTAL DISORDER AFFECTING PREGNANCY IN SECOND TRIMESTER: ICD-10-CM

## 2025-07-29 DIAGNOSIS — O09.521 MULTIGRAVIDA OF ADVANCED MATERNAL AGE IN FIRST TRIMESTER: ICD-10-CM

## 2025-07-29 DIAGNOSIS — Z87.898 HISTORY OF SUBSTANCE USE DISORDER: ICD-10-CM

## 2025-07-29 DIAGNOSIS — R51.9 HEADACHE IN PREGNANCY, ANTEPARTUM: ICD-10-CM

## 2025-07-29 DIAGNOSIS — O98.419 HEPATITIS C VIRUS INFECTION IN MOTHER DURING PREGNANCY: ICD-10-CM

## 2025-07-29 DIAGNOSIS — O43.129 VELAMENTOUS INSERTION OF UMBILICAL CORD, ANTEPARTUM: ICD-10-CM

## 2025-07-29 DIAGNOSIS — O46.90 VAGINAL BLEEDING DURING PREGNANCY, ANTEPARTUM: ICD-10-CM

## 2025-07-29 DIAGNOSIS — B19.20 HEPATITIS C VIRUS INFECTION IN MOTHER DURING PREGNANCY: ICD-10-CM

## 2025-07-29 DIAGNOSIS — O26.899 HEADACHE IN PREGNANCY, ANTEPARTUM: ICD-10-CM

## 2025-07-29 PROBLEM — O47.1 FALSE LABOR AFTER 37 COMPLETED WEEKS OF GESTATION: Status: RESOLVED | Noted: 2023-07-23 | Resolved: 2025-07-29

## 2025-07-29 PROCEDURE — 3008F BODY MASS INDEX DOCD: CPT | Mod: CPTII,S$GLB,, | Performed by: OBSTETRICS & GYNECOLOGY

## 2025-07-29 PROCEDURE — 1159F MED LIST DOCD IN RCRD: CPT | Mod: CPTII,S$GLB,, | Performed by: OBSTETRICS & GYNECOLOGY

## 2025-07-29 PROCEDURE — 3078F DIAST BP <80 MM HG: CPT | Mod: CPTII,S$GLB,, | Performed by: OBSTETRICS & GYNECOLOGY

## 2025-07-29 PROCEDURE — 1160F RVW MEDS BY RX/DR IN RCRD: CPT | Mod: CPTII,S$GLB,, | Performed by: OBSTETRICS & GYNECOLOGY

## 2025-07-29 PROCEDURE — 76816 OB US FOLLOW-UP PER FETUS: CPT | Mod: S$GLB,,, | Performed by: OBSTETRICS & GYNECOLOGY

## 2025-07-29 PROCEDURE — 99205 OFFICE O/P NEW HI 60 MIN: CPT | Mod: S$GLB,,, | Performed by: OBSTETRICS & GYNECOLOGY

## 2025-07-29 PROCEDURE — 3074F SYST BP LT 130 MM HG: CPT | Mod: CPTII,S$GLB,, | Performed by: OBSTETRICS & GYNECOLOGY

## 2025-07-29 PROCEDURE — 3044F HG A1C LEVEL LT 7.0%: CPT | Mod: CPTII,S$GLB,, | Performed by: OBSTETRICS & GYNECOLOGY

## 2025-07-29 RX ORDER — PROPRANOLOL HYDROCHLORIDE 80 MG/1
80 CAPSULE, EXTENDED RELEASE ORAL DAILY
Qty: 30 CAPSULE | Refills: 6 | Status: SHIPPED | OUTPATIENT
Start: 2025-07-29

## 2025-07-29 NOTE — ASSESSMENT & PLAN NOTE
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 She is involved with her Mu-[employer] which helps maintain sobriety. We have applauded her efforts of continued sobriety and encouraged continued cessation.   She denies any needs at this time.

## 2025-07-29 NOTE — ASSESSMENT & PLAN NOTE
She reports that she started having vaginal spotting at approx 6 weeks EGA. She continued to have intermittent spotting until approx 14wks when she experienced a gush of bright red blood. She sought care at the ED and ultimately discharged. Bleeding slowly decreased in quantity until two weeks later when she had another gush of bright red blood. She was told she had a NAILA and placed on pelvic rest.   7/29/25-  In the past two weeks, bleeding decreased significantly and now having scant amount of brown spotting. Denies abd pain aside from rare, intermittent mild lower abd cramping. Right lateral area of concern noted with approx dimensions 3.98 x 0.87 x 4.43cm. We have discussed the increased risk of PPROM/PTL if she continues to experience frequent bleeding episodes. Bleeding precautions provided. Continue pelvic rest. Blood type: A pos

## 2025-07-29 NOTE — ASSESSMENT & PLAN NOTE
There is a relationship between maternal age and fetal aneuploidy. Additionally, there is an association between advanced maternal age (AMA) and preeclampsia, gestational diabetes, and fetal growth restriction.   We discussed the risks and benefits of screening tests versus definitive genetic testing (amniocentesis). We discussed the limitations of ultrasound in the definitive diagnosis of fetal aneuploidy.  Her questions were answered and after today's consultation she did not want to pursue amniocentesis.  She has completed NIPT with low risk results (46, XY).    Recommendations:  Detailed anatomic survey at 19-20 weeks    Follow-up fetal ultrasound at 32-34 weeks for interval fetal growth  Continue low dose aspirin for preeclampsia risk reduction

## 2025-07-29 NOTE — ASSESSMENT & PLAN NOTE
A velamentous umbilical cord is characterized by membranous umbilical vessels at the placental insertion site; the remainder of the cord is usually normal. Membranous vessels can also arise as aberrant branches of a marginally inserted umbilical cord or they can connect lobes of a bilobed placenta or the placenta and a succenturiate lobe. Because of the lack of protection from Meghans jelly, these vessels are prone to compression and rupture, especially when they are located in the membranes covering the cervical os (ie, vasa previa). Velamentous insertion occurs in approximately 1% of hilario gestations.      Because the vessels are attached to the chorion, rupture of the fetal membranes may rupture the vessels, which can result in fetal exsanguination and death within minutes. This typically occurs when the membranous vessels are close to or cover the cervix; rarely, membranous vessels have ruptured in the absence of documented membrane rupture.  In a 2015 systematic review and meta-analysis that evaluated the association between placental implantation abnormalities and risk of  delivery in hilario gestations, velamentous cord insertions were associated with a high  delivery rate (37.5%) and increased  risks, such as  intensive care unit admissions, small for gestational age, and  death.      There are no data from large or controlled studies on which to base management recommendations. There is no evidence that interventions such as late  induction of labor or scheduled  delivery improve outcome although many people recommend this.      Recommendations:     Serial assessment of fetal growth with MFM  NSTs weekly, starting at 34 weeks, to assess for recurrent variable decelerations from kinking or compression  Counseling patients to go to PAU as soon as labor begins  Delivery by 38 weeks of gestation  Consider steroids if IUGR or earlier abnormal heart  problems exist

## 2025-07-29 NOTE — ASSESSMENT & PLAN NOTE
She has a long standing history of migraines and is established with neurology. She is taking Sumatriptan and Fioricet PRN - however states she often runs out of her prescriptions monthly due to limited supply. Tylenol typically does not relieve her headaches. She reports taking Propranolol in the past for psychosomatic symptoms associated with anxiety, however, she has not taken as migraine preventative so she is unsure of its efficacy for her.   She is currently taking Magnesium supplements. She reports seeing neuro last week who advised her she does not have any options for med mgmt during pregnancy given her history of miscarriage (x2).  She does endorse grinding her teeth at night and wears a mouthguard. Her symptoms are worse in the mornings. She does recognize her headache frequency may be attributed to stress.    Recommendations  -migraine diary to identify triggers  -Propranolol QHS  -stress mgmt

## 2025-07-29 NOTE — ASSESSMENT & PLAN NOTE
I counseled the patient regarding the risks of hepatitis C in pregnancy. In general, women chronically infected with hepatitis C have an uneventful pregnancy. In HIV-negative patients, the risk of vertical transmission is approximately 5%. The risk of vertical transmission increases in the presence of HIV co-infection. Pregnancy complications can include fetal growth restriction and low birthweight. Treatment with antiviral medications is not recommended during pregnancy.    She was diagnosed with Hep C in 2022 and reports receiving full treatment at that time through her G.I. doctor. Recent CMP with normal LFTs. No recent quant.    Recommendations:  -Screen for concurrent HIV infection, hepatitis A and hepatitis B infection, and other sexually transmitted diseases (syphilis, gonorrhea, and chlamydia) - completed w/ OB  -Vaccinate for hepatitis A and hepatitis B if non-immune  -Obtain baseline LFTs and hepatitis C viral load (VL ordered today)  -Avoid invasive fetal procedures intrapartum (early amniotomy, fetal scalp electrode, and episiotomy) unless necessary.  -Breastfeeding is not contraindicated (recommend abstain from breastfeeding if nipples are bleeding or cracked).

## 2025-07-29 NOTE — PROGRESS NOTES
MATERNAL-FETAL MEDICINE   CONSULT NOTE    Provider requesting consultation: Dr ZULEIMA Serrato    SUBJECTIVE:     Ms. Kerri Ramsey is a 40 y.o.  female with IUP at 17w4d who is seen in consultation by MFM for evaluation and management of:  Problem   - Multigravida of advanced maternal age in second trimester   -Hx anxiety and depression affecting pregnancy in second trimester   - Hepatitis C virus infection in mother during pregnancy   - History of substance use disorder   - Headache in pregnancy, antepartum   - Vaginal bleeding during pregnancy, antepartum   - Velamentous insertion of umbilical cord, antepartum     Kerri presents for consultation due to the age in which she will deliver.  She has completed NIPT with low risk results (46,XY).  She is taking a low-dose aspirin daily.  She started experiencing a small amount of vaginal spotting at approximately 6 weeks EGA.  At 14 weeks, she had a gush of bright red blood in went to the ED for evaluation.  After that ED visit, she states the bleeding decreased in quantity and returned to small amount of spotting.  At 16 weeks, she had another gush of bright red blood in went back to the ED.  She was diagnosed with a subchorionic hemorrhage and placed on pelvic rest.  Since the last ED visit about 2 weeks ago, she states the bleeding has decreased significantly in quantity and now having intermittent brown spotting.  She denies any significant abdominal pain at this time.  She has a history of substance use (meth and oral pain medication).  She has been sober for 11 years and is engaged in continued recovery.  She denies any needs at this time.  She was diagnosed with hepatitis-C in  and received full treatment at that time with her GI doctor.  Recent CMP with normal liver enzymes.  No recent quant.  She has a longstanding history of migraines and has been followed by Neurology.  She is taking Sumatriptan and Fioricet PRN - however states she often runs out of  her prescriptions monthly due to limited supply. She briefly took propranolol as treatment for psychosomatic symptoms associated with anxiety, but she states she has never taken it regularly as headache prevention.  She has complaints of daily severe headaches that are sometimes debilitating.  Tylenol does not relieve her headache.  She is currently taking magnesium supplements without adequate relief.  She recently had a visit with neuro who did not recommend any medication alternatives given her 2 previous miscarriages.  She has a history of anxiety (especially with driving) and depression.  She is not currently taking any medication for these diagnoses.  She feels as though her anxiety has improved in the 2nd trimester and her symptoms are now well managed.  She has tried and failed Zoloft and BuSpar in the past.  Small amt of brown vaginal spotting. Denies abd pain, LOF.        Medication List with Changes/Refills   Current Medications    BUTALBITAL-ACETAMINOPHEN-CAFFEINE -40 MG (FIORICET, ESGIC) -40 MG PER TABLET    Take 1 tablet by mouth every 6 (six) hours as needed for Headaches.    PRENATAL VIT NO.124/IRON/FOLIC (PRENATAL VITAMIN ORAL)    Take by mouth.    SUMATRIPTAN (IMITREX) 50 MG TABLET    Take 1 tablet (50 mg total) by mouth once. for 1 dose   Discontinued Medications    ONDANSETRON (ZOFRAN-ODT) 4 MG TBDL    Take 1 tablet (4 mg total) by mouth every 6 (six) hours as needed (nausea).       Review of patient's allergies indicates:  No Known Allergies    PMH:  Past Medical History:   Diagnosis Date    Anxiety     Hepatitis C     treated     Human papilloma virus     Migraine        PObHx:  OB History    Para Term  AB Living   4 1 1  2 1   SAB IAB Ectopic Multiple Live Births   2   0 1      # Outcome Date GA Lbr Michael/2nd Weight Sex Type Anes PTL Lv   4 Current            3 SAB 2025 8w0d    SAB      2 Term 23 40w0d 19:45 / 01:29 3.71 kg (8 lb 2.9 oz) M Vag-Spont EPI N  "SERGIO   1 SAB 2022 5w0d    SAB          PSH:  Past Surgical History:   Procedure Laterality Date    TONSILLECTOMY  6 years old    TYMPANOSTOMY TUBE PLACEMENT      WISDOM TOOTH EXTRACTION         Family history:family history includes Alcohol abuse in her maternal grandfather; Cancer in her father; Diabetes in her maternal grandmother and mother; Heart disease in her mother; Hypertension in her mother; Miscarriages / Stillbirths in her mother and sister.    Social history: reports that she has quit smoking. Her smoking use included cigarettes. She has never used smokeless tobacco. She reports that she does not currently use alcohol. She reports that she does not currently use drugs after having used the following drugs: Methamphetamines.    Genetic history: The patient denies any inherited genetic diseases or birth defects in herself or her partner's personal history or family.    Objective:   /72 (BP Location: Right arm, Patient Position: Sitting)   Pulse 72   Ht 5' 6" (1.676 m)   Wt 81 kg (178 lb 9.2 oz)   Breastfeeding Unknown   BMI 28.82 kg/m²     Ultrasound performed. See viewpoint for full ultrasound report.    A hilario living IUP is identified, and the biometry is appropriate for established gestational age. Early, limited anatomy appears normal. The placenta is posterior, fundal with an anterior velamentous cord insertion. A right lateral subchorionic hematoma is noted measuring 4 x 0.9 x 4.4cm.     ASSESSMENT/PLAN:     40 y.o.  female with IUP at 17w4d     - Multigravida of advanced maternal age in second trimester  There is a relationship between maternal age and fetal aneuploidy. Additionally, there is an association between advanced maternal age (AMA) and preeclampsia, gestational diabetes, and fetal growth restriction.   We discussed the risks and benefits of screening tests versus definitive genetic testing (amniocentesis). We discussed the limitations of ultrasound in the " definitive diagnosis of fetal aneuploidy.  Her questions were answered and after today's consultation she did not want to pursue amniocentesis.  She has completed NIPT with low risk results (46, XY).    Recommendations:  Detailed anatomic survey at 19-20 weeks    Follow-up fetal ultrasound at 32-34 weeks for interval fetal growth  Continue low dose aspirin for preeclampsia risk reduction    -Hx anxiety and depression affecting pregnancy in second trimester  She reports a history of anxiety (especially with driving) and depression. She is not taking medication daily at this time. She reports stable symptoms without medication. In the past, she has tried Zoloft but discontinue due to severely low libido. Additionally, she has tried Buspar in the past but it made her symptoms worse.  There are increased risks for peripartum and postpartum mood disorders. Precautions provided.     It is important to optimize mental health conditions during pregnancy in an effort to reduce the risk of postpartum mood disorders. There are options for management including psychotherapy, counseling, cognitive behavioral therapy, exercise/yoga, journaling, and psychiatry services.       - Hepatitis C virus infection in mother during pregnancy  I counseled the patient regarding the risks of hepatitis C in pregnancy. In general, women chronically infected with hepatitis C have an uneventful pregnancy. In HIV-negative patients, the risk of vertical transmission is approximately 5%. The risk of vertical transmission increases in the presence of HIV co-infection. Pregnancy complications can include fetal growth restriction and low birthweight. Treatment with antiviral medications is not recommended during pregnancy.    She was diagnosed with Hep C in 2022 and reports receiving full treatment at that time through her G.I. doctor. Recent CMP with normal LFTs. No recent quant.    Recommendations:  -Screen for concurrent HIV infection, hepatitis A  and hepatitis B infection, and other sexually transmitted diseases (syphilis, gonorrhea, and chlamydia) - completed w/ OB  -Vaccinate for hepatitis A and hepatitis B if non-immune  -Obtain baseline LFTs and hepatitis C viral load (VL ordered today)  -Avoid invasive fetal procedures intrapartum (early amniotomy, fetal scalp electrode, and episiotomy) unless necessary.  -Breastfeeding is not contraindicated (recommend abstain from breastfeeding if nipples are bleeding or cracked).          - History of substance use disorder  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 She is involved with her [employer] which helps maintain sobriety. We have applauded her efforts of continued sobriety and encouraged continued cessation.   She denies any needs at this time.     - Headache in pregnancy, antepartum  She has a long standing history of migraines and is established with neurology. She is taking Sumatriptan and Fioricet PRN - however states she often runs out of her prescriptions monthly due to limited supply. Tylenol typically does not relieve her headaches. She reports taking Propranolol in the past for psychosomatic symptoms associated with anxiety, however, she has not taken as migraine preventative so she is unsure of its efficacy for her.   She is currently taking Magnesium supplements. She reports seeing neuro last week who advised her she does not have any options for med mgmt during pregnancy given her history of miscarriage (x2).  She does endorse grinding her teeth at night and wears a mouthguard. Her symptoms are worse in the mornings. She does recognize her headache frequency may be attributed to stress.    Recommendations  -migraine diary to identify triggers  -Propranolol QHS  -stress mgmt    - Vaginal bleeding during pregnancy, antepartum  She reports that she started having vaginal spotting at approx 6 weeks EGA. She continued to have intermittent spotting until approx  14wks when she experienced a gush of bright red blood. She sought care at the ED and ultimately discharged. Bleeding slowly decreased in quantity until two weeks later when she had another gush of bright red blood. She was told she had a NAILA and placed on pelvic rest.   25-  In the past two weeks, bleeding decreased significantly and now having scant amount of brown spotting. Denies abd pain aside from rare, intermittent mild lower abd cramping. Right lateral area of concern noted with approx dimensions 3.98 x 0.87 x 4.43cm. We have discussed the increased risk of PPROM/PTL if she continues to experience frequent bleeding episodes. Bleeding precautions provided. Continue pelvic rest. Blood type: A pos    - Velamentous insertion of umbilical cord, antepartum  A velamentous umbilical cord is characterized by membranous umbilical vessels at the placental insertion site; the remainder of the cord is usually normal. Membranous vessels can also arise as aberrant branches of a marginally inserted umbilical cord or they can connect lobes of a bilobed placenta or the placenta and a succenturiate lobe. Because of the lack of protection from Manys jelly, these vessels are prone to compression and rupture, especially when they are located in the membranes covering the cervical os (ie, vasa previa). Velamentous insertion occurs in approximately 1% of hilario gestations.      Because the vessels are attached to the chorion, rupture of the fetal membranes may rupture the vessels, which can result in fetal exsanguination and death within minutes. This typically occurs when the membranous vessels are close to or cover the cervix; rarely, membranous vessels have ruptured in the absence of documented membrane rupture.  In a 2015 systematic review and meta-analysis that evaluated the association between placental implantation abnormalities and risk of  delivery in hilario gestations, velamentous cord insertions were  associated with a high  delivery rate (37.5%) and increased  risks, such as  intensive care unit admissions, small for gestational age, and  death.      There are no data from large or controlled studies on which to base management recommendations. There is no evidence that interventions such as late  induction of labor or scheduled  delivery improve outcome although many people recommend this.      Recommendations:     Serial assessment of fetal growth with MFM  NSTs weekly, starting at 34 weeks, to assess for recurrent variable decelerations from kinking or compression  Counseling patients to go to PAU as soon as labor begins  Delivery by 38 weeks of gestation  Consider steroids if IUGR or earlier abnormal heart problems exist             FOLLOW UP:   F/u in 4 weeks for US/MFM visit    This consultation was completed with the assistance of Radha Devries NP.      Mireya Pederson MD  Maternal Fetal Medicine           Electronically Signed by Radha Devries 2025

## 2025-07-29 NOTE — ASSESSMENT & PLAN NOTE
She reports a history of anxiety (especially with driving) and depression. She is not taking medication daily at this time. She reports stable symptoms without medication. In the past, she has tried Zoloft but discontinue due to severely low libido. Additionally, she has tried Buspar in the past but it made her symptoms worse.  There are increased risks for peripartum and postpartum mood disorders. Precautions provided.     It is important to optimize mental health conditions during pregnancy in an effort to reduce the risk of postpartum mood disorders. There are options for management including psychotherapy, counseling, cognitive behavioral therapy, exercise/yoga, journaling, and psychiatry services.